# Patient Record
Sex: MALE | Race: BLACK OR AFRICAN AMERICAN | Employment: UNEMPLOYED | URBAN - METROPOLITAN AREA
[De-identification: names, ages, dates, MRNs, and addresses within clinical notes are randomized per-mention and may not be internally consistent; named-entity substitution may affect disease eponyms.]

---

## 2017-01-02 ENCOUNTER — HOSPITAL ENCOUNTER (EMERGENCY)
Age: 67
Discharge: HOME | End: 2017-01-02
Attending: EMERGENCY MEDICINE
Payer: OTHER GOVERNMENT

## 2017-01-02 ENCOUNTER — APPOINTMENT (OUTPATIENT)
Dept: GENERAL RADIOLOGY | Age: 67
End: 2017-01-02
Attending: EMERGENCY MEDICINE
Payer: OTHER GOVERNMENT

## 2017-01-02 ENCOUNTER — APPOINTMENT (OUTPATIENT)
Dept: CT IMAGING | Age: 67
End: 2017-01-02
Attending: EMERGENCY MEDICINE
Payer: OTHER GOVERNMENT

## 2017-01-02 VITALS
OXYGEN SATURATION: 99 % | RESPIRATION RATE: 16 BRPM | DIASTOLIC BLOOD PRESSURE: 74 MMHG | HEART RATE: 84 BPM | BODY MASS INDEX: 32.47 KG/M2 | HEIGHT: 66 IN | WEIGHT: 202 LBS | TEMPERATURE: 99 F | SYSTOLIC BLOOD PRESSURE: 142 MMHG

## 2017-01-02 DIAGNOSIS — R56.9 SEIZURE (HCC): Primary | ICD-10-CM

## 2017-01-02 LAB
ALBUMIN SERPL BCP-MCNC: 3.4 G/DL (ref 3.4–5)
ALBUMIN/GLOB SERPL: 0.8 {RATIO} (ref 0.8–1.7)
ALP SERPL-CCNC: 108 U/L (ref 45–117)
ALT SERPL-CCNC: 35 U/L (ref 16–61)
ANION GAP BLD CALC-SCNC: 17 MMOL/L (ref 3–18)
AST SERPL W P-5'-P-CCNC: 17 U/L (ref 15–37)
BASOPHILS # BLD AUTO: 0 K/UL (ref 0–0.06)
BASOPHILS # BLD: 0 % (ref 0–2)
BILIRUB SERPL-MCNC: 0.4 MG/DL (ref 0.2–1)
BUN SERPL-MCNC: 29 MG/DL (ref 7–18)
BUN/CREAT SERPL: 15 (ref 12–20)
CALCIUM SERPL-MCNC: 9.1 MG/DL (ref 8.5–10.1)
CHLORIDE SERPL-SCNC: 103 MMOL/L (ref 100–108)
CK SERPL-CCNC: 306 U/L (ref 39–308)
CO2 SERPL-SCNC: 20 MMOL/L (ref 21–32)
CREAT SERPL-MCNC: 1.91 MG/DL (ref 0.6–1.3)
DIFFERENTIAL METHOD BLD: ABNORMAL
EOSINOPHIL # BLD: 0.4 K/UL (ref 0–0.4)
EOSINOPHIL NFR BLD: 4 % (ref 0–5)
ERYTHROCYTE [DISTWIDTH] IN BLOOD BY AUTOMATED COUNT: 12.5 % (ref 11.6–14.5)
GLOBULIN SER CALC-MCNC: 4.2 G/DL (ref 2–4)
GLUCOSE SERPL-MCNC: 253 MG/DL (ref 74–99)
HCT VFR BLD AUTO: 37.8 % (ref 36–48)
HGB BLD-MCNC: 13 G/DL (ref 13–16)
LACTATE SERPL-SCNC: 2.1 MMOL/L (ref 0.4–2)
LACTATE SERPL-SCNC: 9.3 MMOL/L (ref 0.4–2)
LYMPHOCYTES # BLD AUTO: 17 % (ref 21–52)
LYMPHOCYTES # BLD: 1.6 K/UL (ref 0.9–3.6)
MAGNESIUM SERPL-MCNC: 2 MG/DL (ref 1.8–2.4)
MCH RBC QN AUTO: 34.3 PG (ref 24–34)
MCHC RBC AUTO-ENTMCNC: 34.4 G/DL (ref 31–37)
MCV RBC AUTO: 99.7 FL (ref 74–97)
MONOCYTES # BLD: 0.4 K/UL (ref 0.05–1.2)
MONOCYTES NFR BLD AUTO: 4 % (ref 3–10)
NEUTS SEG # BLD: 7.1 K/UL (ref 1.8–8)
NEUTS SEG NFR BLD AUTO: 75 % (ref 40–73)
PLATELET # BLD AUTO: 209 K/UL (ref 135–420)
PMV BLD AUTO: 10 FL (ref 9.2–11.8)
POTASSIUM SERPL-SCNC: 4.9 MMOL/L (ref 3.5–5.5)
PROT SERPL-MCNC: 7.6 G/DL (ref 6.4–8.2)
RBC # BLD AUTO: 3.79 M/UL (ref 4.7–5.5)
SODIUM SERPL-SCNC: 140 MMOL/L (ref 136–145)
WBC # BLD AUTO: 9.5 K/UL (ref 4.6–13.2)

## 2017-01-02 PROCEDURE — 70450 CT HEAD/BRAIN W/O DYE: CPT

## 2017-01-02 PROCEDURE — 80053 COMPREHEN METABOLIC PANEL: CPT | Performed by: EMERGENCY MEDICINE

## 2017-01-02 PROCEDURE — 96361 HYDRATE IV INFUSION ADD-ON: CPT

## 2017-01-02 PROCEDURE — 83735 ASSAY OF MAGNESIUM: CPT | Performed by: EMERGENCY MEDICINE

## 2017-01-02 PROCEDURE — 99285 EMERGENCY DEPT VISIT HI MDM: CPT

## 2017-01-02 PROCEDURE — 85025 COMPLETE CBC W/AUTO DIFF WBC: CPT | Performed by: EMERGENCY MEDICINE

## 2017-01-02 PROCEDURE — 83605 ASSAY OF LACTIC ACID: CPT | Performed by: EMERGENCY MEDICINE

## 2017-01-02 PROCEDURE — 96374 THER/PROPH/DIAG INJ IV PUSH: CPT

## 2017-01-02 PROCEDURE — 82550 ASSAY OF CK (CPK): CPT | Performed by: EMERGENCY MEDICINE

## 2017-01-02 PROCEDURE — 93005 ELECTROCARDIOGRAM TRACING: CPT

## 2017-01-02 PROCEDURE — 71010 XR CHEST PORT: CPT

## 2017-01-02 PROCEDURE — 74011250636 HC RX REV CODE- 250/636: Performed by: EMERGENCY MEDICINE

## 2017-01-02 RX ORDER — LEVETIRACETAM 10 MG/ML
1000 INJECTION INTRAVASCULAR ONCE
Status: COMPLETED | OUTPATIENT
Start: 2017-01-02 | End: 2017-01-02

## 2017-01-02 RX ORDER — SODIUM CHLORIDE 0.9 % (FLUSH) 0.9 %
5-10 SYRINGE (ML) INJECTION EVERY 8 HOURS
Status: DISCONTINUED | OUTPATIENT
Start: 2017-01-02 | End: 2017-01-02 | Stop reason: HOSPADM

## 2017-01-02 RX ORDER — SODIUM CHLORIDE 0.9 % (FLUSH) 0.9 %
5-10 SYRINGE (ML) INJECTION AS NEEDED
Status: DISCONTINUED | OUTPATIENT
Start: 2017-01-02 | End: 2017-01-02 | Stop reason: HOSPADM

## 2017-01-02 RX ADMIN — SODIUM CHLORIDE 1000 ML: 900 INJECTION, SOLUTION INTRAVENOUS at 13:12

## 2017-01-02 RX ADMIN — LEVETIRACETAM 1000 MG: 10 INJECTION INTRAVENOUS at 12:04

## 2017-01-02 NOTE — Clinical Note
SPECIAL DISCHARGE INSTRUCTIONS AND COMMENTS: 
 
General comments: Thank you for allowing us to provide you with excellent care today. We hope we addressed all of your concerns and needs. We strive to provide excellent quality care in the Emergency Depart ment. If you feel that you have not received excellent quality care or timely care, please ask to speak to the nurse manager. Please choose us in the future for your continued health care needs. Follow-up comments: The exam and treatment you rece ived in the Emergency Department were for an urgent problem that may be new for you and / or one which may be related to a worsening of a chronic or ongoing medical problem that you already had prior to this visit. In any case, today's treatment is not i ntended to be considered as complete care in all cases and thus, it is important that you follow-up with a doctor, nurse practitioner, or physicians assistant to: (1) confirm your diagnosis, (2) re-evaluation of changes in your illness and treatment, an d (3) for ongoing care. In some cases we may have contacted your doctor or a specific specialist who may be involved in your future evaluation and care but in any case, take this sheet with you when you go to your follow-up visit or refer to the infor ann on these sheets when you are calling to arrange an appointment - it may prove helpful in making the appointment. Prescribed Medications: Unless you have been directed by the provider to change your current medicines, you should continue to alaina e them as before. If you have been prescribed medicines, please take them as directed. In some cases, these new medicines are intended to replace a medicine that you are currently taking and if so, this will be noted below.  
 
 Our expectation is that y our condition will improve by following the doctor's recommendations, however, if in the event your condition worsens or does not improve as expected, please follow-up with your PCP or if unable to reach your usual health care provider, you should return  to the Emergency Department. We are available 24 hours a day.   
 
SPECIFIC INSTRUCTIONS PROVIDED BY YOUR DOCTOR, Katie Rivera MD:

## 2017-01-02 NOTE — DISCHARGE INSTRUCTIONS

## 2017-01-02 NOTE — ED PROVIDER NOTES
HPI Comments: 11:41 AM   Cristiane Singleton is a 77 y.o. Male with a h/o DM, HTN, and seizures presenting via EMS to the ED s/p seizure this AM. Pt reports that he doesn't remember anything that happened today prior to EMS arriving. States that he has not had a seizure in 1 year. Does not currently take any medication for seizures. Pt is unsure of what medications he has taken in the past for seizures. Former tobacco user. Pt denies fever, chills, N/V/D, neck pain, and any other Sx or complaints. History limited due to pt's condition (post ictal and slightly confused). The history is provided by the patient. No  was used. Past Medical History:   Diagnosis Date    Diabetes ([facility] [address]     Hypercholesteremia     Hypertension     Seizures ([facility] [address]        Past Surgical History:   Procedure Laterality Date    Pr abdomen surgery proc unlisted       appendectomy         No family history on file. Social History     Social History    Marital status:      Spouse name: N/A    Number of children: N/A    Years of education: N/A     Occupational History    Not on file. Social History Main Topics    Smoking status: Never Smoker    Smokeless tobacco: Not on file    Alcohol use Yes      Comment: ocassionally    Drug use: Not on file    Sexual activity: Not on file     Other Topics Concern    Not on file     Social History Narrative    No narrative on file         ALLERGIES: Pcn [penicillins]    Review of Systems   Unable to perform ROS: Acuity of condition   Constitutional: Negative for appetite change, chills, fever and unexpected weight change. HENT: Negative for congestion, sore throat and trouble swallowing. Eyes: Negative for pain, redness and visual disturbance. Respiratory: Negative for cough, shortness of breath and wheezing. Cardiovascular: Negative for chest pain and leg swelling.    Gastrointestinal: Negative for abdominal pain, blood in stool, constipation, diarrhea, nausea and vomiting. Endocrine: Negative for polyuria. Genitourinary: Negative for difficulty urinating, dysuria and urgency. Musculoskeletal: Negative for arthralgias, myalgias and neck stiffness. Skin: Negative for rash. Allergic/Immunologic: Negative for immunocompromised state. Neurological: Negative for dizziness, syncope, weakness and headaches. Hematological: Does not bruise/bleed easily. Psychiatric/Behavioral: Negative for decreased concentration. Vitals:    01/02/17 1400 01/02/17 1415 01/02/17 1430 01/02/17 1445   BP: 156/78 164/75 158/76 169/80   Pulse: 91 91 95 91   Resp: 17 17 22 15   Temp:       SpO2: 100% 100% 99% 100%   Weight:       Height:                Physical Exam   Nursing note and vitals reviewed. -------------------------PHYSICAL EXAM-------------------------    Vital signs and nursing notes reviewed  Nursing note and VS were reviewed      CONSTITUTIONAL: Well developed, well nourished and appears adequately hydrated. Awake and alert. Non-toxic in appearance, not diaphoretic. Afebrile. NAD. HEAD: Normocephalic, Atraumatic. EYES: Pupils are equal, round and reactive. Extra-ocular movements intact. Sclera anicteric. Conjunctiva not injected. ENT: Mucous membranes are moist. There is no erythema or swelling of the mucosal tissues or enlargement of the tonsillar tissue or the presence of exudates. No oral lesions or thrush. The left TM is unremarkable. Both EAC's are without swelling or erythema. Both TM's unremarkable. Nasal mucosa pink with no discharge and the turbinates are of normal size. NECK: Normal ROM. Neck is supple and nontender. No posterior cervical paraspinal or midline tenderness. No obvious enlargement of the thyroid. No significant anterior cervical adenopathy. Trachea is midline. CARDIOVASCULAR:  Distant but regular rhythm. No murmurs, rubs or gallops.  Distal pulses are 2+ and equal.    PULMONARY: Respiratory effort is normal and without the use of accessory muscles. Patient is speaking in full sentences. Clear to auscultation bilaterally. No wheezing, rales or rhonchi. CHEST WALL: Normal shape;  non tender to palpation; no crepitus    ABDOMINAL: Soft and non-distended. Obese, no obvious tenderness. NO peritoneal signs - No rebound, guarding or rigidity. Active bowel sounds present. BACK: No thoracolumbar midline or paraspinal tenderness. Full range of motion. No CVA tenderness. MUSCULOSKELETAL: No obvious soft tissue tenderness or sites of bony tenderness or deformities; Full range of motion in all extremities. No obvious muscle tenderness. No joint inflammation. No peripheral edema. No obvious skeletal injuries. SKIN: Skin is warm and dry. Good skin turgor. No diaphoresis, lesions,  Rashes, or petechiae. Cap Refill is Normal.    NEUROLOGICAL: Alert, awake and appropriately oriented. Normal speech. CN's are normal;  Motor - no focal weakness; no obvious sensory loss; Cerebellar function- intact; DTR's - 2+ equal    PSYCH: normal thought content; no expressed suicidal ideation. MDM  Number of Diagnoses or Management Options  Diagnosis management comments: INITIAL CLINICAL IMPRESSION and PLANS:  The patient presents with the primary complaint(s) of: seizure. The presentation, to include historical aspects and clinical findings are consistent with the DX of epilepsy. However, other possible DX's to consider as primary, associated with, or exacerbated by include:    1. Metabolic derangement    2.  Intracranial process       Amount and/or Complexity of Data Reviewed  Clinical lab tests: reviewed and ordered  Tests in the radiology section of CPT®: reviewed and ordered (CXR, CT Head)  Tests in the medicine section of CPT®: reviewed and ordered (EKG)  Independent visualization of images, tracings, or specimens: yes (EKG, CXR)      ED Course       Procedures    EKG interpretation: (Preliminary)  Sinus tachycardia at 112 bpm. RBBB. Left posterior fascicular block. CT 11/17/14: no change. EKG read by Trupti He MD at 11:51 AM.     X-RAY FINDINGS:  1:38 PM  Chest x-ray shows NAP. Pending review by Radiologist  Recorded by SHAHID Burrell, as dictated by Trupti He MD    PROGRESS NOTE:   1:41 PM  Pt has been re-examined by Trupti He MD. Once pt had become more alert, he now indicates that he continues to take his Keppra. However, its only 500 mg once a day. Written by SHAHID Burrell, as dictated by Trupti He MD.    ED CLINICAL SUMMARY - DISCHARGE     1:14 PM  CLINICAL COURSE while in the ED:      Intervention)s) while in ED:  ACTIONS / APPROACH: Based on the presenting RECURRENT history of seizure. My initial focus was to Determine the cause and extent of the problem and Initiate Treatment as Appropriate . Details of actions taken are noted below. SPECIFICS REGARDING APPROACH: based on the hx of not being on ACM's will do full load of Keppra. 1. DIAGNOSTIC RESULTS:   CT HEAD WO CONT   Final Result    1. No acute intracranial findings        2. Nonspecific white matter changes, likely related to chronic small vessel  Ischemia    As read by the radiologist.      XR CHEST PORT      Final Result    No radiographic evidence of an acute abnormality.     As read by the radiologist.            Labs Reviewed   CBC WITH AUTOMATED DIFF - Abnormal; Notable for the following:        Result Value    RBC 3.79 (*)     MCV 99.7 (*)     MCH 34.3 (*)     NEUTROPHILS 75 (*)     LYMPHOCYTES 17 (*)     All other components within normal limits   METABOLIC PANEL, COMPREHENSIVE - Abnormal; Notable for the following:     CO2 20 (*)     Glucose 253 (*)     BUN 29 (*)     Creatinine 1.91 (*)     GFR est AA 43 (*)     GFR est non-AA 35 (*)     Globulin 4.2 (*)     All other components within normal limits   LACTIC ACID, PLASMA - Abnormal; Notable for the following:     Lactic acid 9.3 (*)     All other components within normal limits   LACTIC ACID, PLASMA - Abnormal; Notable for the following:     Lactic acid 2.1 (*)     All other components within normal limits   MAGNESIUM   CK           Recent Results (from the past 12 hour(s))   CBC WITH AUTOMATED DIFF    Collection Time: 01/02/17 11:45 AM   Result Value Ref Range    WBC 9.5 4.6 - 13.2 K/uL    RBC 3.79 (L) 4.70 - 5.50 M/uL    HGB 13.0 13.0 - 16.0 g/dL    HCT 37.8 36.0 - 48.0 %    MCV 99.7 (H) 74.0 - 97.0 FL    MCH 34.3 (H) 24.0 - 34.0 PG    MCHC 34.4 31.0 - 37.0 g/dL    RDW 12.5 11.6 - 14.5 %    PLATELET 574 586 - 826 K/uL    MPV 10.0 9.2 - 11.8 FL    NEUTROPHILS 75 (H) 40 - 73 %    LYMPHOCYTES 17 (L) 21 - 52 %    MONOCYTES 4 3 - 10 %    EOSINOPHILS 4 0 - 5 %    BASOPHILS 0 0 - 2 %    ABS. NEUTROPHILS 7.1 1.8 - 8.0 K/UL    ABS. LYMPHOCYTES 1.6 0.9 - 3.6 K/UL    ABS. MONOCYTES 0.4 0.05 - 1.2 K/UL    ABS. EOSINOPHILS 0.4 0.0 - 0.4 K/UL    ABS. BASOPHILS 0.0 0.0 - 0.06 K/UL    DF AUTOMATED     METABOLIC PANEL, COMPREHENSIVE    Collection Time: 01/02/17 11:45 AM   Result Value Ref Range    Sodium 140 136 - 145 mmol/L    Potassium 4.9 3.5 - 5.5 mmol/L    Chloride 103 100 - 108 mmol/L    CO2 20 (L) 21 - 32 mmol/L    Anion gap 17 3.0 - 18 mmol/L    Glucose 253 (H) 74 - 99 mg/dL    BUN 29 (H) 7.0 - 18 MG/DL    Creatinine 1.91 (H) 0.6 - 1.3 MG/DL    BUN/Creatinine ratio 15 12 - 20      GFR est AA 43 (L) >60 ml/min/1.73m2    GFR est non-AA 35 (L) >60 ml/min/1.73m2    Calcium 9.1 8.5 - 10.1 MG/DL    Bilirubin, total 0.4 0.2 - 1.0 MG/DL    ALT 35 16 - 61 U/L    AST 17 15 - 37 U/L    Alk.  phosphatase 108 45 - 117 U/L    Protein, total 7.6 6.4 - 8.2 g/dL    Albumin 3.4 3.4 - 5.0 g/dL    Globulin 4.2 (H) 2.0 - 4.0 g/dL    A-G Ratio 0.8 0.8 - 1.7     MAGNESIUM    Collection Time: 01/02/17 11:45 AM   Result Value Ref Range    Magnesium 2.0 1.8 - 2.4 mg/dL   LACTIC ACID, PLASMA    Collection Time: 01/02/17 11:45 AM   Result Value Ref Range    Lactic acid 9.3 (HH) 0.4 - 2.0 MMOL/L   CK    Collection Time: 01/02/17 11:45 AM   Result Value Ref Range     39 - 308 U/L   EKG, 12 LEAD, INITIAL    Collection Time: 01/02/17 11:51 AM   Result Value Ref Range    Ventricular Rate 112 BPM    Atrial Rate 112 BPM    P-R Interval 126 ms    QRS Duration 136 ms    Q-T Interval 360 ms    QTC Calculation (Bezet) 491 ms    Calculated P Axis 62 degrees    Calculated R Axis 168 degrees    Calculated T Axis 47 degrees    Diagnosis       Sinus tachycardia  Right bundle branch block  Left posterior fascicular block  Bifascicular block  Abnormal ECG  When compared with ECG of 17-NOV-2014 01:29,  Sinus rhythm has replaced Atrial fibrillation  Questionable change in QRS axis     LACTIC ACID, PLASMA    Collection Time: 01/02/17  2:53 PM   Result Value Ref Range    Lactic acid 2.1 (HH) 0.4 - 2.0 MMOL/L       2. MEDICATIONS GIVEN:   Medications   sodium chloride (NS) flush 5-10 mL (not administered)   sodium chloride (NS) flush 5-10 mL (not administered)   levETIRAcetam (KEPPRA) 1,000 mg in 100 ml IVPB (0 mg IntraVENous IV Completed 1/2/17 1219)   sodium chloride 0.9 % bolus infusion 1,000 mL (0 mL IntraVENous IV Completed 1/2/17 2227)   sodium chloride 0.9 % bolus infusion 1,000 mL (0 mL IntraVENous IV Completed 1/2/17 1776)        Response to Intervention(s):   IMPROVED ; once pt became more clear, he notes that he has been taking Keppra but the dose is below the usual dosing parameters. Unanticipated Developments: NONE     ED COURSE - General Comment:  During the ED course I had re-evaluated the patient, answered their and /or their family's questions regarding my clinical impression, the patient's condition and plans for therapeutic interventions. The patient's ED course was uneventful and remained stable throughout.     CLINICAL IMPRESSION AND DISCUSSION:   I reviewed our electronic medical record system for any past medical records that were available that may contribute to the patients current condition, the nursing notes and vital signs from today's visit. Based on the clinical presentation, findings and results of diagnostic studies, as well as developments while in the ED,  I suspect the following: For the presentation noted above    The most likely cause or diagnosis is: seizure       DISCUSSION REGARDING DIAGNOSIS: The specifics regarding my clinical impression / diagnosis are as follows: At this time there is no clinical evidence to support other pertinent diagnostic considerations such as:   N/A    Finally, other diagnostic considerations during this visit are noted below in Clinical Impression. Specific Conversations:  NONE      DISPOSITION DECISION:     DISCHARGE: I feel that we have optimized outpatient assessment and management such that Philis Scheuermann is stable to be discharged and to continue with her care or complete any additional evaluation as appropriate at home or as an outpatient. Preparations will be made to discharge the patient. Present condition at the time of disposition: STABLE    DISCUSSION REGARDING THE DISPOSITION:         DISCHARGE NOTE:    Sohan Shepherd's  results have been reviewed with him. He has been counseled regarding his diagnosis, treatment, and plan. He verbally conveys understanding and agreement of the signs, symptoms, diagnosis, treatment and prognosis and additionally agrees to follow up as discussed. He also agrees with the care-plan and conveys that all of his questions have been answered. I have also provided discharge instructions for him that include: educational information regarding their diagnosis and treatment, and list of reasons why they would want to return to the ED prior to their follow-up appointment, should his condition change. The patient and/or family has been provided with education for proper Emergency Department utilization. CLINICAL IMPRESSION  1. Seizure ([address]        PLAN:  1. D/C home  2.    Patient's Medications   Start Taking    No medications on file   Continue Taking    APIXABAN (ELIQUIS) 2.5 MG TABLET    Take 1 tablet by mouth two (2) times a day. GLIPIZIDE (GLUCOTROL) 10 MG TABLET    Take 10 mg by mouth two (2) times a day. Dose unknown by pt     LEVETIRACETAM (KEPPRA) 1,000 MG TABLET    Take  by mouth two (2) times a day. Dose unknown by pt     LEVETIRACETAM (KEPPRA) 1,000 MG TABLET    Take 1 Tab by mouth two (2) times a day. METFORMIN (GLUCOPHAGE) 1,000 MG TABLET    Take 1,000 mg by mouth two (2) times daily (with meals). Dose unknown by pt     ROSUVASTATIN (CRESTOR) 20 MG TABLET    Take 20 mg by mouth daily. Dose unknown by pt     TERAZOSIN 10 MG TAB    Take  by mouth. Dose unknown by pt    These Medications have changed    No medications on file   Stop Taking    No medications on file     3. Follow-up Information     Follow up With Details Comments [address] [facility], DO Schedule an appointment as soon as possible for a visit in 2 days Follow up with your PCP. [address]      [facility] EMERGENCY DEPT  As needed, If symptoms worsen [address] [place] [address]  [phone]        Return if sxs worsen    ATTESTATIONS:  This note is prepared by Cain Mead, acting as Scribe for Katie Rivera MD.    Katie Rivera MD: The scribe's documentation has been prepared under my direction and personally reviewed by me in its entirety. I confirm that the note above accurately reflects all work, treatment, procedures, and medical decision making performed by me.
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
## 2017-01-02 NOTE — ED NOTES
Pt discharged to home, reviewed instructions with patient, patient verbalized understanding of instructions and follow up plan. Arm band removed and shredded. All questions answered. Patient discharged in no acute distress.

## 2017-01-03 LAB
ATRIAL RATE: 112 BPM
CALCULATED P AXIS, ECG09: 62 DEGREES
CALCULATED R AXIS, ECG10: 168 DEGREES
CALCULATED T AXIS, ECG11: 47 DEGREES
DIAGNOSIS, 93000: NORMAL
P-R INTERVAL, ECG05: 126 MS
Q-T INTERVAL, ECG07: 360 MS
QRS DURATION, ECG06: 136 MS
QTC CALCULATION (BEZET), ECG08: 491 MS
VENTRICULAR RATE, ECG03: 112 BPM

## 2017-04-21 ENCOUNTER — HOSPITAL ENCOUNTER (EMERGENCY)
Age: 67
Discharge: HOME | End: 2017-04-21
Attending: FAMILY MEDICINE
Payer: OTHER GOVERNMENT

## 2017-04-21 ENCOUNTER — APPOINTMENT (OUTPATIENT)
Dept: GENERAL RADIOLOGY | Age: 67
End: 2017-04-21
Attending: FAMILY MEDICINE
Payer: OTHER GOVERNMENT

## 2017-04-21 VITALS
SYSTOLIC BLOOD PRESSURE: 157 MMHG | HEIGHT: 66 IN | HEART RATE: 84 BPM | BODY MASS INDEX: 32.14 KG/M2 | DIASTOLIC BLOOD PRESSURE: 77 MMHG | OXYGEN SATURATION: 100 % | WEIGHT: 200 LBS | RESPIRATION RATE: 16 BRPM | TEMPERATURE: 97.9 F

## 2017-04-21 DIAGNOSIS — S70.02XA CONTUSION OF LEFT HIP, INITIAL ENCOUNTER: ICD-10-CM
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
## 2017-04-21 DIAGNOSIS — S46.812A TRAPEZIUS STRAIN, LEFT, INITIAL ENCOUNTER: Primary | ICD-10-CM

## 2017-04-21 PROCEDURE — 99283 EMERGENCY DEPT VISIT LOW MDM: CPT

## 2017-04-21 PROCEDURE — 74011250637 HC RX REV CODE- 250/637: Performed by: FAMILY MEDICINE

## 2017-04-21 PROCEDURE — 73030 X-RAY EXAM OF SHOULDER: CPT

## 2017-04-21 PROCEDURE — 73502 X-RAY EXAM HIP UNI 2-3 VIEWS: CPT

## 2017-04-21 RX ORDER — CYCLOBENZAPRINE HCL 10 MG
10 TABLET ORAL
Qty: 20 TAB | Refills: 0 | Status: SHIPPED | OUTPATIENT
Start: 2017-04-21

## 2017-04-21 RX ORDER — KETOROLAC TROMETHAMINE 10 MG/1
10 TABLET, FILM COATED ORAL ONCE
Status: COMPLETED | OUTPATIENT
Start: 2017-04-21 | End: 2017-04-21

## 2017-04-21 RX ORDER — HYDROCODONE BITARTRATE AND ACETAMINOPHEN 5; 325 MG/1; MG/1
1 TABLET ORAL
Qty: 10 TAB | Refills: 0 | Status: SHIPPED | OUTPATIENT
Start: 2017-04-21

## 2017-04-21 RX ADMIN — KETOROLAC TROMETHAMINE 10 MG: 10 TABLET, FILM COATED ORAL at 11:26
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
## 2017-04-21 NOTE — ED NOTES
Care assumed for discharge. Pt given scripts x2 with discharge instructions and verbalizes understanding. Patient armband removed and shredded. Pt discharged home ambulatory, no distress noted.
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
## 2020-08-08 ENCOUNTER — HOSPITAL ENCOUNTER (EMERGENCY)
Age: 70
Discharge: HOME | End: 2020-08-08
Attending: EMERGENCY MEDICINE
Payer: OTHER GOVERNMENT

## 2020-08-08 VITALS
TEMPERATURE: 97.5 F | OXYGEN SATURATION: 100 % | HEART RATE: 96 BPM | RESPIRATION RATE: 18 BRPM | BODY MASS INDEX: 32.28 KG/M2 | HEIGHT: 66 IN | SYSTOLIC BLOOD PRESSURE: 140 MMHG | DIASTOLIC BLOOD PRESSURE: 51 MMHG

## 2020-08-08 DIAGNOSIS — Z20.822 ENCOUNTER FOR LABORATORY TESTING FOR COVID-19 VIRUS: Primary | ICD-10-CM

## 2020-08-08 PROCEDURE — 99282 EMERGENCY DEPT VISIT SF MDM: CPT

## 2020-08-08 PROCEDURE — 87635 SARS-COV-2 COVID-19 AMP PRB: CPT

## 2020-08-08 NOTE — ED TRIAGE NOTES
Pt states \" I brought a patient to the ER and he was admitted and someone said that he was positive for Covid and so here my wife and I are to be tested just incase. \"

## 2020-08-08 NOTE — DISCHARGE INSTRUCTIONS
Patient Education        Coronavirus (GABPR-22): Care Instructions  Overview  The coronavirus disease (COVID-19) is caused by a virus. Symptoms may include a fever, a cough, and shortness of breath. It mainly spreads person-to-person through droplets from coughing and sneezing. The virus also can spread when people are in close contact with someone who is infected. Most people have mild symptoms and can take care of themselves at home. If their symptoms get worse, they may need care in a hospital. There is no medicine to fight the virus. It's important to not spread the virus to others. If you have COVID-19, wear a face cover anytime you are around other people. You need to isolate yourself while you are sick. Your doctor or local public health official will tell you when you no longer need to be isolated. Leave your home only if you need to get medical care. Follow-up care is a key part of your treatment and safety. Be sure to make and go to all appointments, and call your doctor if you are having problems. It's also a good idea to know your test results and keep a list of the medicines you take. How can you care for yourself at home? · Get extra rest. It can help you feel better. · Drink plenty of fluids. This helps replace fluids lost from fever. Fluids also help ease a scratchy throat. Water, soup, fruit juice, and hot tea with lemon are good choices. · Take acetaminophen (such as Tylenol) to reduce a fever. It may also help with muscle aches. Read and follow all instructions on the label. · Sponge your body with lukewarm water to help with fever. Don't use cold water or ice. · Use petroleum jelly on sore skin. This can help if the skin around your nose and lips becomes sore from rubbing a lot with tissues. Tips for isolation  · Wear a cloth face cover when you are around other people. It can help stop the spread of the virus when you cough or sneeze. · Limit contact with people in your home.  If possible, stay in a separate bedroom and use a separate bathroom. · If you have to leave home, avoid crowds and try to stay at least 6 feet away from other people. · Avoid contact with pets and other animals. · Cover your mouth and nose with a tissue when you cough or sneeze. Then throw it in the trash right away. · Wash your hands often, especially after you cough or sneeze. Use soap and water, and scrub for at least 20 seconds. If soap and water aren't available, use an alcohol-based hand . · Don't share personal household items. These include bedding, towels, cups and glasses, and eating utensils. · [address] in the warmest water allowed for the fabric type, and dry it completely. It's okay to wash other people's laundry with yours. · Clean and disinfect your home every day. Use household  and disinfectant wipes or sprays. Take special care to clean things that you grab with your hands. These include doorknobs, remote controls, phones, and handles on your refrigerator and microwave. And don't forget countertops, tabletops, bathrooms, and computer keyboards. When should you call for help? [address]GG[address] anytime you think you may need emergency care. For example, call if you have life-threatening symptoms, such as:  · You have severe trouble breathing. (You can't talk at all.)  · You have constant chest pain or pressure. · You are severely dizzy or lightheaded. · You are confused or can't think clearly. · Your face and lips have a blue color. · You pass out (lose consciousness) or are very hard to wake up. Call your doctor now or seek immediate medical care if:  · You have moderate trouble breathing. (You can't speak a full sentence.)  · You are coughing up blood (more than about 1 teaspoon). · You have signs of low blood pressure. These include feeling lightheaded; being too weak to stand; and having cold, pale, clammy skin.   Watch closely for changes in your health, and be sure to contact your doctor if:  · Your symptoms get worse. · You are not getting better as expected. Call before you go to the doctor's office. Follow their instructions. And wear a cloth face cover. Current as of: May 8, 2020               Content Version: 12.5  © 2006-2020 [employer]. Care instructions adapted under license by [employer] (which disclaims liability or warranty for this information). If you have questions about a medical condition or this instruction, always ask your healthcare professional. [place] [address] any warranty or liability for your use of this information.
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
## 2020-08-10 ENCOUNTER — TELEPHONE (OUTPATIENT)
Dept: CASE MANAGEMENT | Age: 70
End: 2020-08-10

## 2020-08-10 NOTE — TELEPHONE ENCOUNTER
Patient contacted regarding recent discharge and COVID-19 risk   Care Coordinator contacted the patient by telephone to perform post discharge assessment. Verified name and  with patient as identifiers. Patient has following risk factors of: diabetes. Care Coordinator reviewed discharge instructions, medical action plan and red flags related to discharge diagnosis. Reviewed and educated them on any new and changed medications related to discharge diagnosis. Advised obtaining a 90-day supply of all daily and as-needed medications. Education provided regarding infection prevention, and signs and symptoms of COVID-19 and when to seek medical attention with patient who verbalized understanding. Discussed exposure protocols and quarantine from [address] you at higher risk for severe illness  and given an opportunity for questions and concerns. The patient agrees to contact the COVID-19 hotline [phone] or PCP office for questions related to their healthcare. Care Coordinator provided contact information for future reference. From CDC: Are you at higher risk for severe illness?  Wash your hands often.  Avoid close contact (6 feet, which is about two arm lengths) with people who are sick.  Put distance between yourself and other people if COVID-19 is spreading in your community.  Clean and disinfect frequently touched surfaces.  Avoid all cruise travel and non-essential air travel.  Call your healthcare professional if you have concerns about COVID-19 and your underlying condition or if you are sick. For more information on steps you can take to protect yourself, see CDC's How to Protect Yourself      Patient/family/caregiver given information for [employer] and agrees to enroll no  Patient's preferred e-mail:    Patient's preferred phone number:   Based on Loop alert triggers, patient will be contacted by nurse care manager for worsening symptoms.     Plan for follow-up call in 7-14 days based on severity of symptoms and risk factors.

## 2020-08-11 LAB
SARS-COV-2, COV2NT: NOT DETECTED
SOURCE, COVRS: NORMAL
SPECIMEN TYPE, XMCV1T: NORMAL

## 2020-08-27 ENCOUNTER — TELEPHONE (OUTPATIENT)
Dept: CASE MANAGEMENT | Age: 70
End: 2020-08-27

## 2021-01-15 ENCOUNTER — HOSPITAL ENCOUNTER (EMERGENCY)
Age: 71
Discharge: HOME | End: 2021-01-15
Attending: EMERGENCY MEDICINE | Admitting: EMERGENCY MEDICINE

## 2021-01-15 VITALS
DIASTOLIC BLOOD PRESSURE: 67 MMHG | TEMPERATURE: 97.3 F | SYSTOLIC BLOOD PRESSURE: 129 MMHG | WEIGHT: 210 LBS | RESPIRATION RATE: 18 BRPM | HEIGHT: 67 IN | HEART RATE: 83 BPM | BODY MASS INDEX: 32.96 KG/M2 | OXYGEN SATURATION: 99 %

## 2021-01-15 DIAGNOSIS — Z20.822 ENCOUNTER FOR LABORATORY TESTING FOR COVID-19 VIRUS: Primary | ICD-10-CM

## 2021-01-15 PROCEDURE — 99281 EMR DPT VST MAYX REQ PHY/QHP: CPT

## 2021-01-15 PROCEDURE — 87635 SARS-COV-2 COVID-19 AMP PRB: CPT

## 2021-01-15 NOTE — ED PROVIDER NOTES
EMERGENCY DEPARTMENT HISTORY AND PHYSICAL EXAM    Date: 1/15/2021  Patient Name: Jaylen Song    History of Presenting Illness     Chief Complaint   Patient presents with    Covid Testing         History Provided By: Patient    11:00 AM  Jaylen Song is a 79 y.o. male with PMHX of A. fib, hypertension, hyperlipidemia, seizures, diabetes who presents to the emergency department C/O request for COVID-19 test.  Patient asymptomatic. States he may have been exposed to somebody at the [employer] 1/9/2021 (6 days ago). Pt denies fever, cough, shortness of breath, and any other sxs or complaints. PCP: Sophy Rizzo MD    Current Outpatient Medications   Medication Sig Dispense Refill    LISINOPRIL PO Take  by mouth.  cyclobenzaprine (FLEXERIL) 10 mg tablet Take 1 Tab by mouth three (3) times daily (with meals). 20 Tab 0    HYDROcodone-acetaminophen (NORCO) 5-325 mg per tablet Take 1 Tab by mouth every four (4) hours as needed for Pain. Max Daily Amount: 6 Tabs. 10 Tab 0    apixaban (ELIQUIS) 2.5 mg tablet Take 1 tablet by mouth two (2) times a day. 60 tablet 0    levETIRAcetam (KEPPRA) 1,000 mg tablet Take 1 Tab by mouth two (2) times a day. 60 Tab 0    levetiracetam (KEPPRA) 1,000 mg tablet Take  by mouth two (2) times a day. Dose unknown by pt       glipiZIDE (GLUCOTROL) 10 mg tablet Take 10 mg by mouth two (2) times a day. Dose unknown by pt       rosuvastatin (CRESTOR) 20 mg tablet Take 20 mg by mouth daily. Dose unknown by pt          Past History     Past Medical History:  Past Medical History:   Diagnosis Date    Atrial fibrillation ([address]     Diabetes ([address]     Hypercholesteremia     Hypertension     Seizures ([address]        Past Surgical History:  Past Surgical History:   Procedure Laterality Date    [place] ABDOMEN SURGERY PROC UNLISTED      appendectomy       Family History:  History reviewed. No pertinent family history.     Social History:  Social History     Tobacco Use    Smoking status: Never Smoker  Smokeless tobacco: Never Used   Substance Use Topics    Alcohol use: Yes     Comment: ocassionally    Drug use: Never       Allergies: Allergies   Allergen Reactions    Pcn [Penicillins] Other (comments)         Review of Systems   Review of Systems   Constitutional: Negative for fever. Respiratory: Negative for cough and shortness of breath. All other systems reviewed and are negative. Physical Exam     Vitals:    01/15/21 1051   BP: 129/67   Pulse: 83   Resp: 18   Temp: 97.3 °F (36.3 °C)   SpO2: 99%   Weight: 95.3 kg (210 lb)   Height: 5' 7\" (1.702 m)     Physical Exam  Vital signs and nursing notes reviewed. CONSTITUTIONAL: Alert. Well-appearing; well-nourished; in no apparent distress. HEAD: Normocephalic; atraumatic. CV: Normal S1, S2; no murmurs, rubs, or gallops. RESPIRATORY: Normal chest excursion with respiration; breath sounds clear and equal bilaterally; no wheezes, rhonchi, or rales. NEURO: A & O x3. PSYCH:  Mood and affect appropriate. Diagnostic Study Results     Labs -     Recent Results (from the past 12 hour(s))   SARS-COV-2    Collection Time: 01/15/21 10:53 AM   Result Value Ref Range    SARS-CoV-2 PENDING     Specimen source Nasopharyngeal      Specimen type NP Swab         Radiologic Studies -   No orders to display     CT Results  (Last 48 hours)    None        CXR Results  (Last 48 hours)    None          Medications given in the ED-  Medications - No data to display      Medical Decision Making   I am the first provider for this patient. I reviewed the vital signs, available nursing notes, past medical history, past surgical history, family history and social history. Vital Signs-Reviewed the patient's vital signs. Records Reviewed: Nursing Notes      Procedures:  Procedures    ED Course:  11:00 AM   Initial assessment performed.  The patients presenting problems have been discussed, and they are in agreement with the care plan formulated and outlined with them. I have encouraged them to ask questions as they arise throughout their visit. Provider Notes (Medical Decision Making): James Acevedo is a 79 y.o. male presents for COVID-19 testing status post possible exposure 6 days ago. Advised that even if the test is negative today, they should remain in quarantine for 14 days post exposure and come to the ED if any significant Covid symptoms like shortness of breath or weakness. Diagnosis and Disposition       DISCHARGE NOTE:    Mercedes Shepherd's  results have been reviewed with him. He has been counseled regarding his diagnosis, treatment, and plan. He verbally conveys understanding and agreement of the signs, symptoms, diagnosis, treatment and prognosis and additionally agrees to follow up as discussed. He also agrees with the care-plan and conveys that all of his questions have been answered. I have also provided discharge instructions for him that include: educational information regarding their diagnosis and treatment, and list of reasons why they would want to return to the ED prior to their follow-up appointment, should his condition change. He has been provided with education for proper emergency department utilization. CLINICAL IMPRESSION:    1. Encounter for laboratory testing for COVID-19 virus        PLAN:  1. D/C Home  2. Discharge Medication List as of 1/15/2021 11:08 AM        3. Follow-up Information     Follow up With Specialties Details Why Contact Info    Your PCP   As needed     [facility] EMERGENCY DEPT Emergency Medicine  As needed, If symptoms worsen [address] [place] [address]  [phone]        _______________________________      Please note that this dictation was completed with [employer], the [employer] voice recognition software. Quite often unanticipated grammatical, syntax, homophones, and other interpretive errors are inadvertently transcribed by the computer software.   Please disregard these errors. Please excuse any errors that have escaped final proofreading.

## 2021-01-16 LAB
SARS-COV-2, COV2NT: NOT DETECTED
SOURCE, COVRS: NORMAL
SPECIMEN TYPE, XMCV1T: NORMAL

## 2021-02-16 ENCOUNTER — HOSPITAL ENCOUNTER (INPATIENT)
Age: 71
LOS: 1 days | Discharge: HOME | DRG: 179 | End: 2021-02-17
Attending: EMERGENCY MEDICINE | Admitting: FAMILY MEDICINE
Payer: MEDICARE

## 2021-02-16 DIAGNOSIS — R19.7 DIARRHEA, UNSPECIFIED TYPE: ICD-10-CM

## 2021-02-16 DIAGNOSIS — N17.9 AKI (ACUTE KIDNEY INJURY) (HCC): Primary | ICD-10-CM

## 2021-02-16 PROBLEM — I10 HTN (HYPERTENSION): Status: ACTIVE | Noted: 2021-02-16

## 2021-02-16 PROBLEM — I48.0 PAF (PAROXYSMAL ATRIAL FIBRILLATION) (HCC): Status: ACTIVE | Noted: 2021-02-16

## 2021-02-16 PROBLEM — E86.0 DEHYDRATION: Status: ACTIVE | Noted: 2021-02-16

## 2021-02-16 PROBLEM — E11.9 DM (DIABETES MELLITUS) (HCC): Status: ACTIVE | Noted: 2021-02-16

## 2021-02-16 PROBLEM — Z20.822 SUSPECTED 2019-NCOV INFECTION: Status: ACTIVE | Noted: 2021-02-16

## 2021-02-16 LAB
ALBUMIN SERPL-MCNC: 3 G/DL (ref 3.4–5)
ALBUMIN SERPL-MCNC: 3.3 G/DL (ref 3.4–5)
ALBUMIN/GLOB SERPL: 0.9 {RATIO} (ref 0.8–1.7)
ALP SERPL-CCNC: 110 U/L (ref 45–117)
ALT SERPL-CCNC: 49 U/L (ref 16–61)
ANION GAP SERPL CALC-SCNC: 4 MMOL/L (ref 3–18)
ANION GAP SERPL CALC-SCNC: 8 MMOL/L (ref 3–18)
ANION GAP SERPL CALC-SCNC: 8 MMOL/L (ref 3–18)
AST SERPL-CCNC: 48 U/L (ref 10–38)
ATRIAL RATE: 83 BPM
BASOPHILS # BLD: 0 K/UL (ref 0–0.1)
BASOPHILS NFR BLD: 0 % (ref 0–2)
BILIRUB SERPL-MCNC: 0.6 MG/DL (ref 0.2–1)
BUN SERPL-MCNC: 26 MG/DL (ref 7–18)
BUN SERPL-MCNC: 28 MG/DL (ref 7–18)
BUN SERPL-MCNC: 30 MG/DL (ref 7–18)
BUN/CREAT SERPL: 12 (ref 12–20)
BUN/CREAT SERPL: 12 (ref 12–20)
BUN/CREAT SERPL: 13 (ref 12–20)
CALCIUM SERPL-MCNC: 7.6 MG/DL (ref 8.5–10.1)
CALCIUM SERPL-MCNC: 8.1 MG/DL (ref 8.5–10.1)
CALCIUM SERPL-MCNC: 8.7 MG/DL (ref 8.5–10.1)
CALCULATED P AXIS, ECG09: 64 DEGREES
CALCULATED R AXIS, ECG10: 134 DEGREES
CALCULATED T AXIS, ECG11: 61 DEGREES
CHLORIDE SERPL-SCNC: 107 MMOL/L (ref 100–111)
CHLORIDE SERPL-SCNC: 109 MMOL/L (ref 100–111)
CHLORIDE SERPL-SCNC: 111 MMOL/L (ref 100–111)
CK MB CFR SERPL CALC: NORMAL % (ref 0–4)
CK MB SERPL-MCNC: <1 NG/ML (ref 5–25)
CK SERPL-CCNC: 183 U/L (ref 39–308)
CO2 SERPL-SCNC: 24 MMOL/L (ref 21–32)
CO2 SERPL-SCNC: 24 MMOL/L (ref 21–32)
CO2 SERPL-SCNC: 25 MMOL/L (ref 21–32)
CREAT SERPL-MCNC: 2.06 MG/DL (ref 0.6–1.3)
CREAT SERPL-MCNC: 2.25 MG/DL (ref 0.6–1.3)
CREAT SERPL-MCNC: 2.47 MG/DL (ref 0.6–1.3)
DIAGNOSIS, 93000: NORMAL
DIFFERENTIAL METHOD BLD: ABNORMAL
EOSINOPHIL # BLD: 0 K/UL (ref 0–0.4)
EOSINOPHIL NFR BLD: 1 % (ref 0–5)
ERYTHROCYTE [DISTWIDTH] IN BLOOD BY AUTOMATED COUNT: 12 % (ref 11.6–14.5)
GLOBULIN SER CALC-MCNC: 3.7 G/DL (ref 2–4)
GLUCOSE BLD STRIP.AUTO-MCNC: 114 MG/DL (ref 70–110)
GLUCOSE BLD STRIP.AUTO-MCNC: 58 MG/DL (ref 70–110)
GLUCOSE BLD STRIP.AUTO-MCNC: 64 MG/DL (ref 70–110)
GLUCOSE SERPL-MCNC: 142 MG/DL (ref 74–99)
GLUCOSE SERPL-MCNC: 48 MG/DL (ref 74–99)
GLUCOSE SERPL-MCNC: 90 MG/DL (ref 74–99)
HCT VFR BLD AUTO: 37.9 % (ref 36–48)
HGB BLD-MCNC: 12.8 G/DL (ref 13–16)
LYMPHOCYTES # BLD: 0.8 K/UL (ref 0.9–3.6)
LYMPHOCYTES NFR BLD: 21 % (ref 21–52)
MCH RBC QN AUTO: 34.9 PG (ref 24–34)
MCHC RBC AUTO-ENTMCNC: 33.8 G/DL (ref 31–37)
MCV RBC AUTO: 103.3 FL (ref 74–97)
MONOCYTES # BLD: 0.5 K/UL (ref 0.05–1.2)
MONOCYTES NFR BLD: 14 % (ref 3–10)
NEUTS SEG # BLD: 2.4 K/UL (ref 1.8–8)
NEUTS SEG NFR BLD: 64 % (ref 40–73)
P-R INTERVAL, ECG05: 128 MS
PHOSPHATE SERPL-MCNC: 3.6 MG/DL (ref 2.5–4.9)
PLATELET # BLD AUTO: 158 K/UL (ref 135–420)
PMV BLD AUTO: 10.6 FL (ref 9.2–11.8)
POTASSIUM SERPL-SCNC: 4.3 MMOL/L (ref 3.5–5.5)
POTASSIUM SERPL-SCNC: 4.3 MMOL/L (ref 3.5–5.5)
POTASSIUM SERPL-SCNC: 4.5 MMOL/L (ref 3.5–5.5)
PROT SERPL-MCNC: 7 G/DL (ref 6.4–8.2)
Q-T INTERVAL, ECG07: 392 MS
QRS DURATION, ECG06: 134 MS
QTC CALCULATION (BEZET), ECG08: 460 MS
RBC # BLD AUTO: 3.67 M/UL (ref 4.7–5.5)
SARS-COV-2, COV2: NORMAL
SODIUM SERPL-SCNC: 139 MMOL/L (ref 136–145)
SODIUM SERPL-SCNC: 140 MMOL/L (ref 136–145)
SODIUM SERPL-SCNC: 141 MMOL/L (ref 136–145)
TROPONIN I SERPL-MCNC: <0.02 NG/ML (ref 0–0.04)
VENTRICULAR RATE, ECG03: 83 BPM
WBC # BLD AUTO: 3.8 K/UL (ref 4.6–13.2)

## 2021-02-16 PROCEDURE — 82962 GLUCOSE BLOOD TEST: CPT

## 2021-02-16 PROCEDURE — 99285 EMERGENCY DEPT VISIT HI MDM: CPT

## 2021-02-16 PROCEDURE — 65660000000 HC RM CCU STEPDOWN

## 2021-02-16 PROCEDURE — 74011250636 HC RX REV CODE- 250/636: Performed by: FAMILY MEDICINE

## 2021-02-16 PROCEDURE — U0003 INFECTIOUS AGENT DETECTION BY NUCLEIC ACID (DNA OR RNA); SEVERE ACUTE RESPIRATORY SYNDROME CORONAVIRUS 2 (SARS-COV-2) (CORONAVIRUS DISEASE [COVID-19]), AMPLIFIED PROBE TECHNIQUE, MAKING USE OF HIGH THROUGHPUT TECHNOLOGIES AS DESCRIBED BY CMS-2020-01-R: HCPCS

## 2021-02-16 PROCEDURE — 80053 COMPREHEN METABOLIC PANEL: CPT

## 2021-02-16 PROCEDURE — 93005 ELECTROCARDIOGRAM TRACING: CPT

## 2021-02-16 PROCEDURE — 85025 COMPLETE CBC W/AUTO DIFF WBC: CPT

## 2021-02-16 PROCEDURE — 96361 HYDRATE IV INFUSION ADD-ON: CPT

## 2021-02-16 PROCEDURE — 96360 HYDRATION IV INFUSION INIT: CPT

## 2021-02-16 PROCEDURE — 82553 CREATINE MB FRACTION: CPT

## 2021-02-16 PROCEDURE — 74011250636 HC RX REV CODE- 250/636: Performed by: PHYSICIAN ASSISTANT

## 2021-02-16 PROCEDURE — 84156 ASSAY OF PROTEIN URINE: CPT

## 2021-02-16 PROCEDURE — 80069 RENAL FUNCTION PANEL: CPT

## 2021-02-16 PROCEDURE — 94761 N-INVAS EAR/PLS OXIMETRY MLT: CPT

## 2021-02-16 PROCEDURE — 36415 COLL VENOUS BLD VENIPUNCTURE: CPT

## 2021-02-16 RX ORDER — MAGNESIUM SULFATE 100 %
4 CRYSTALS MISCELLANEOUS AS NEEDED
Status: DISCONTINUED | OUTPATIENT
Start: 2021-02-16 | End: 2021-02-17 | Stop reason: HOSPADM

## 2021-02-16 RX ORDER — SODIUM CHLORIDE 0.9 % (FLUSH) 0.9 %
5-40 SYRINGE (ML) INJECTION EVERY 8 HOURS
Status: DISCONTINUED | OUTPATIENT
Start: 2021-02-16 | End: 2021-02-17 | Stop reason: HOSPADM

## 2021-02-16 RX ORDER — SODIUM CHLORIDE 9 MG/ML
125 INJECTION, SOLUTION INTRAVENOUS CONTINUOUS
Status: DISCONTINUED | OUTPATIENT
Start: 2021-02-16 | End: 2021-02-17 | Stop reason: HOSPADM

## 2021-02-16 RX ORDER — INSULIN LISPRO 100 [IU]/ML
INJECTION, SOLUTION INTRAVENOUS; SUBCUTANEOUS
Status: DISCONTINUED | OUTPATIENT
Start: 2021-02-16 | End: 2021-02-17 | Stop reason: HOSPADM

## 2021-02-16 RX ORDER — SODIUM CHLORIDE 0.9 % (FLUSH) 0.9 %
5-40 SYRINGE (ML) INJECTION AS NEEDED
Status: DISCONTINUED | OUTPATIENT
Start: 2021-02-16 | End: 2021-02-17 | Stop reason: HOSPADM

## 2021-02-16 RX ORDER — DEXTROSE MONOHYDRATE 100 MG/ML
125-250 INJECTION, SOLUTION INTRAVENOUS AS NEEDED
Status: DISCONTINUED | OUTPATIENT
Start: 2021-02-16 | End: 2021-02-17 | Stop reason: HOSPADM

## 2021-02-16 RX ADMIN — SODIUM CHLORIDE 500 ML: 900 INJECTION, SOLUTION INTRAVENOUS at 13:14

## 2021-02-16 RX ADMIN — SODIUM CHLORIDE 125 ML/HR: 900 INJECTION, SOLUTION INTRAVENOUS at 18:18

## 2021-02-16 RX ADMIN — SODIUM CHLORIDE 1000 ML: 900 INJECTION, SOLUTION INTRAVENOUS at 14:11

## 2021-02-16 RX ADMIN — Medication 10 ML: at 22:00

## 2021-02-16 NOTE — ED NOTES
TRANSFER - OUT REPORT:    Verbal report given to June Houston RN(name) on [facility]       Report consisted of patients Situation, Background, Assessment and   Recommendations(SBAR). Information from the following report(s) SBAR, ED Summary, [facility] and Recent Results was reviewed with the receiving nurse. Lines:   Peripheral IV 02/16/21 Right Forearm (Active)   Site Assessment Clean, dry, & intact 02/16/21 1227   Phlebitis Assessment 0 02/16/21 1227   Infiltration Assessment 0 02/16/21 1227   Dressing Status Clean, dry, & intact 02/16/21 1227   Hub Color/Line Status Pink 02/16/21 1227   Action Taken Blood drawn 02/16/21 1227   Alcohol Cap Used Yes 02/16/21 1227        Opportunity for questions and clarification was provided.       Patient transported with:   [employer] Ro[employer]

## 2021-02-16 NOTE — H&P
History & Physical    Patient: Gracy Julien MRN: [id number]  CSN: [id number]    YOB: 1950  Age: 79 y.o. Sex: male      DOA: 2/16/2021  Primary Care Provider:  Other, MD Sophy      Assessment/Plan   Acute Kidney Injury -  Likely due to significant fluid loss from persistent diarrhea  Creatinine 2.47, baseline from [facility]  is 1.7  Rehydrate and trend BMP   Order bilateral renal ultrasound   Order UA   Cardiac enzymes pending, specifically to review CK to evaluate for rhabdomyolysis    Diarrhea - associated with malaise, chills, anorexia and body sweats  Recent covid vaccine, #1  No stools today   Supportive care     Suspect COVID-19 infection -  COVID test pending   Recent vaccine  No acute 02 requirement   covid protocol     Hypertension -  Controlled   Will monitor BP   Resume BP meds as appropriate     Diabtetes mellitus -  fsbg qac and qhs, SSI, ADA     HLD -  Resume home meds as appropriate     PAF on eliquis -  No acute issues   Resume eliquis     Seizure d/o -  No acute isses  Resume home meds       Patient Active Problem List   Diagnosis Code    Seizure ([facility] R56.9    BERNADETTE (acute kidney injury) ([facility] N17.9    Diarrhea R19.7    Suspected 2019-nCoV infection Z20.822    PAF (paroxysmal atrial fibrillation) (HCC) I48.0    DM (diabetes mellitus) ([facility] E11.9    HTN (hypertension) I10    Dehydration E86.0     Estimated length of stay : 2-3 days    CC: malaise, chills, anorexia and body sweats       HPI:     Gracy Julien is a 79 y.o. male with PMHX of hypertension, hyperlipidemia, diabetes, seizures, atrial fibrillation on Eliquis who presents to the emergency department C/O body aches and diarrhea. Patient states he developed intermittent diarrhea last week, has been taking antidiarrhea medication with relief. He had his first COVID-19 vaccine at the [place] [address] 5 days ago. He had bilateral knee injections that same day coincidentally.   The following day he developed mild body aches, mostly in his legs and shoulders. 2 days ago, he had increased body aches, sweats, mild headache and his knee gave out on him and caused him to almost fall. Pt denies fever, chest pain, shortness of breath, abdominal pain, vomiting, and any other sxs or complaints. Past Medical History:   Diagnosis Date    At[facility] ([facility]     Diabetes ([facility]     Hypercholesteremia     Hypertension     Seizures ([facility]        Past Surgical History:   Procedure Laterality Date    [place] ABDOMEN SURGERY PROC UNLISTED      appendectomy       History reviewed. No pertinent family history. Social History     Socioeconomic History    Marital status:      Spouse name: Not on file    Number of children: Not on file    Years of education: Not on file    Highest education level: Not on file   Tobacco Use    Smoking status: Never Smoker    Smokeless tobacco: Never Used   Substance and Sexual Activity    Alcohol use: Yes     Comment: ocassionally    Drug use: Never       Prior to Admission medications    Medication Sig Start Date End Date Taking? Authorizing Provider   LISINOPRIL PO Take  by mouth. Sophy Rizzo MD   cyclobenzaprine (FLEXERIL) 10 mg tablet Take 1 Tab by mouth three (3) times daily (with meals). 4/21/17   Jean Bernard MD   HYDROcodone-acetaminophen (NORCO) 5-325 mg per tablet Take 1 Tab by mouth every four (4) hours as needed for Pain. Max Daily Amount: 6 Tabs. 4/21/17   Jean Bernard MD   apixaban (ELIQUIS) 2.5 mg tablet Take 1 tablet by mouth two (2) times a day. 11/19/14   Jovi Hernandez DO   levETIRAcetam (KEPPRA) 1,000 mg tablet Take 1 Tab by mouth two (2) times a day. 12/24/12   Angella Noble MD   levetiracetam (KEPPRA) 1,000 mg tablet Take  by mouth two (2) times a day. Dose unknown by pt     Sophy Rizzo MD   glipiZIDE (GLUCOTROL) 10 mg tablet Take 10 mg by mouth two (2) times a day.  Dose unknown by pt     Sophy Rizzo MD   rosuvastatin (CRESTOR) 20 mg tablet Take 20 mg by mouth daily. Dose unknown by pt     Other, MD Sophy       Allergies   Allergen Reactions    Pcn [Penicillins] Other (comments)       Review of Systems  Gen: No fever, chills, malaise, weight loss/gain. Heent: No headache, rhinorrhea, epistaxis, ear pain, hearing loss, sinus pain, neck pain/stiffness, sore throat. Heart: No chest pain, palpitations, MALDONADO, pnd, or orthopnea. Resp: No cough, hemoptysis, wheezing and shortness of breath. GI: No nausea, vomiting, diarrhea, constipation, melena or hematochezia. : No urinary obstruction, dysuria or hematuria. Derm: No rash, new skin lesion or pruritis. Musc/skeletal: no bone or joint complains. Vasc: No edema, cyanosis or claudication. Endo: No heat/cold intolerance, no polyuria,polydipsia or polyphagia. Neuro: No unilateral weakness, numbness, tingling. No seizures. Heme: No easy bruising or bleeding. Physical Exam:     Physical Exam:  Visit Vitals  BP (!) 108/50 (BP 1 Location: Left arm, BP Patient Position: At rest)   Pulse 84   Temp 98.4 °F (36.9 °C)   Resp 20   Ht 5' 6\" (1.676 m)   Wt 90.7 kg (200 lb)   SpO2 97%   BMI 32.28 kg/m²      O2 Device: Room air    Temp (24hrs), Av.2 °F (36.8 °C), Min:97.9 °F (36.6 °C), Max:98.4 °F (36.9 °C)    No intake/output data recorded. 02/15 0701 -  1900  In: 1500 [I.V.:1500]  Out: -     General:  Awake, cooperative, no distress. Head:  Normocephalic, without obvious abnormality, atraumatic. Eyes:  Conjunctivae/corneas clear, sclera anicteric, PERRL, EOMs intact. Nose: Nares normal. No drainage or sinus tenderness. Throat: Lips, mucosa, and tongue normal.    Neck: Supple, symmetrical, trachea midline, no adenopathy. Lungs:   Clear to auscultation bilaterally. Heart:  Regular rate and rhythm, S1, S2 normal, no murmur, click, rub or gallop. Abdomen: Soft, non-tender. Bowel sounds normal. No masses,  No organomegaly.    Extremities: Extremities normal, atraumatic, no cyanosis or edema. Capillary refill normal.   Pulses: 2+ and symmetric all extremities. Skin: Skin color as per ethnicity, turgor normal. No rashes or lesions   Neurologic: CNII-XII intact. No focal motor or sensory deficit. Labs Reviewed:    Recent Results (from the past 24 hour(s))   CBC WITH AUTOMATED DIFF    Collection Time: 02/16/21 12:21 PM   Result Value Ref Range    WBC 3.8 (L) 4.6 - 13.2 K/uL    RBC 3.67 (L) 4.70 - 5.50 M/uL    HGB 12.8 (L) 13.0 - 16.0 g/dL    HCT 37.9 36.0 - 48.0 %    .3 (H) 74.0 - 97.0 FL    MCH 34.9 (H) 24.0 - 34.0 PG    MCHC 33.8 31.0 - 37.0 g/dL    RDW 12.0 11.6 - 14.5 %    PLATELET 517 701 - 496 K/uL    MPV 10.6 9.2 - 11.8 FL    NEUTROPHILS 64 40 - 73 %    LYMPHOCYTES 21 21 - 52 %    MONOCYTES 14 (H) 3 - 10 %    EOSINOPHILS 1 0 - 5 %    BASOPHILS 0 0 - 2 %    ABS. NEUTROPHILS 2.4 1.8 - 8.0 K/UL    ABS. LYMPHOCYTES 0.8 (L) 0.9 - 3.6 K/UL    ABS. MONOCYTES 0.5 0.05 - 1.2 K/UL    ABS. EOSINOPHILS 0.0 0.0 - 0.4 K/UL    ABS. BASOPHILS 0.0 0.0 - 0.1 K/UL    DF AUTOMATED     METABOLIC PANEL, COMPREHENSIVE    Collection Time: 02/16/21 12:21 PM   Result Value Ref Range    Sodium 139 136 - 145 mmol/L    Potassium 4.5 3.5 - 5.5 mmol/L    Chloride 107 100 - 111 mmol/L    CO2 24 21 - 32 mmol/L    Anion gap 8 3.0 - 18 mmol/L    Glucose 142 (H) 74 - 99 mg/dL    BUN 30 (H) 7.0 - 18 MG/DL    Creatinine 2.47 (H) 0.6 - 1.3 MG/DL    BUN/Creatinine ratio 12 12 - 20      GFR est AA 32 (L) >60 ml/min/1.73m2    GFR est non-AA 26 (L) >60 ml/min/1.73m2    Calcium 8.7 8.5 - 10.1 MG/DL    Bilirubin, total 0.6 0.2 - 1.0 MG/DL    ALT (SGPT) 49 16 - 61 U/L    AST (SGOT) 48 (H) 10 - 38 U/L    Alk.  phosphatase 110 45 - 117 U/L    Protein, total 7.0 6.4 - 8.2 g/dL    Albumin 3.3 (L) 3.4 - 5.0 g/dL    Globulin 3.7 2.0 - 4.0 g/dL    A-G Ratio 0.9 0.8 - 1.7     SARS-COV-2    Collection Time: 02/16/21  1:16 PM   Result Value Ref Range    SARS-CoV-2 Please find results under separate order METABOLIC PANEL, BASIC    Collection Time: 02/16/21  3:10 PM   Result Value Ref Range    Sodium 140 136 - 145 mmol/L    Potassium 4.3 3.5 - 5.5 mmol/L    Chloride 111 100 - 111 mmol/L    CO2 25 21 - 32 mmol/L    Anion gap 4 3.0 - 18 mmol/L    Glucose 90 74 - 99 mg/dL    BUN 28 (H) 7.0 - 18 MG/DL    Creatinine 2.25 (H) 0.6 - 1.3 MG/DL    BUN/Creatinine ratio 12 12 - 20      GFR est AA 35 (L) >60 ml/min/1.73m2    GFR est non-AA 29 (L) >60 ml/min/1.73m2    Calcium 7.6 (L) 8.5 - 10.1 MG/DL   CARDIAC PANEL,(CK, CKMB & TROPONIN)    Collection Time: 02/16/21  3:10 PM   Result Value Ref Range    CK - MB <1.0 <3.6 ng/ml    CK-MB Index  0.0 - 4.0 %     CALCULATION NOT PERFORMED WHEN RESULT IS BELOW LINEAR LIMIT     39 - 308 U/L    Troponin-I, QT <0.02 0.0 - 0.045 NG/ML   EKG, 12 LEAD, INITIAL    Collection Time: 02/16/21  5:37 PM   Result Value Ref Range    Ventricular Rate 83 BPM    Atrial Rate 83 BPM    P-R Interval 128 ms    QRS Duration 134 ms    Q-T Interval 392 ms    QTC Calculation (Bezet) 460 ms    Calculated P Axis 64 degrees    Calculated R Axis 134 degrees    Calculated T Axis 61 degrees    Diagnosis       Normal sinus rhythm  Right bundle branch block  Abnormal ECG  When compared with ECG of 02-JAN-2017 11:51,  No significant change was found         Procedures/imaging: see electronic medical records for all procedures/Xrays and details which were not copied into this note but were reviewed prior to creation of Plan      CC: Other, MD Sophy

## 2021-02-16 NOTE — PROGRESS NOTES
1814 : received report from [facility]  3375 : TRANSFER - IN REPORT:    Verbal report received from [facility]) on Evelyns  being received from ED(unit) for routine progression of care      Report consisted of patients Situation, Background, Assessment and   Recommendations(SBAR). Information from the following report(s) SBAR, Kardex, Intake/Output and MAR was reviewed with the receiving nurse. Opportunity for questions and clarification was provided. Assessment completed upon patients arrival to unit and care assumed. 1850 : patient oriented to room, bed in low position, shift assessment completed, patient denies pain or discomfort. Lungs are clear diminished. Fluids started. 1915 : Bedside shift change report given to [address] (oncoming nurse) by Ilana Velasco RN (offgoing nurse). Report included the following information SBAR, Kardex, Intake/Output and MAR.

## 2021-02-16 NOTE — ED PROVIDER NOTES
EMERGENCY DEPARTMENT HISTORY AND PHYSICAL EXAM    Date: 2/16/2021  Patient Name: Jaylen Song    History of Presenting Illness     Chief Complaint   Patient presents with    Back Pain    Diarrhea    Generalized Body Aches         History Provided By: Patient    12:44 PM  Jaylen Song is a 79 y.o. male with PMHX of hypertension, hyperlipidemia, diabetes, seizures, atrial fibrillation on Eliquis who presents to the emergency department C/O body aches and diarrhea. Patient states he developed intermittent diarrhea last week, has been taking antidiarrhea medication with relief. He had his first COVID-19 vaccine at the [facility] 5 days ago. He had bilateral knee injections that same day coincidentally. The following day he developed mild body aches, mostly in his legs and shoulders. 2 days ago, he had increased body aches, sweats, mild headache and his knee gave out on him and caused him to almost fall. Pt denies fever, chest pain, shortness of breath, abdominal pain, vomiting, and any other sxs or complaints. PCP: Sophy Rizzo MD    Current Outpatient Medications   Medication Sig Dispense Refill    LISINOPRIL PO Take  by mouth.  cyclobenzaprine (FLEXERIL) 10 mg tablet Take 1 Tab by mouth three (3) times daily (with meals). 20 Tab 0    HYDROcodone-acetaminophen (NORCO) 5-325 mg per tablet Take 1 Tab by mouth every four (4) hours as needed for Pain. Max Daily Amount: 6 Tabs. 10 Tab 0    apixaban (ELIQUIS) 2.5 mg tablet Take 1 tablet by mouth two (2) times a day. 60 tablet 0    levETIRAcetam (KEPPRA) 1,000 mg tablet Take 1 Tab by mouth two (2) times a day. 60 Tab 0    levetiracetam (KEPPRA) 1,000 mg tablet Take  by mouth two (2) times a day. Dose unknown by pt       glipiZIDE (GLUCOTROL) 10 mg tablet Take 10 mg by mouth two (2) times a day. Dose unknown by pt       rosuvastatin (CRESTOR) 20 mg tablet Take 20 mg by mouth daily.  Dose unknown by pt          Past History     Past Medical History:  Past Medical History:   Diagnosis Date    Atrial fibrillation ([facility] [address] [address]     [facility]     Hypercholesteremia     Hypertension     Seizures ([facility]        Past Surgical History:  Past Surgical History:   Procedure Laterality Date    AR ABDOMEN SURGERY PROC UNLISTED      appendectomy       Family History:  History reviewed. No pertinent family history. Social History:  Social History     Tobacco Use    Smoking status: Never Smoker    Smokeless tobacco: Never Used   Substance Use Topics    Alcohol use: Yes     Comment: ocassionally    Drug use: Never       Allergies: Allergies   Allergen Reactions    Pcn [Penicillins] Other (comments)         Review of Systems   Review of Systems   Constitutional: Positive for diaphoresis. Respiratory: Negative for cough and shortness of breath. Cardiovascular: Negative for chest pain. Gastrointestinal: Positive for diarrhea. Negative for abdominal pain, nausea and vomiting. All other systems reviewed and are negative. Physical Exam     Vitals:    02/16/21 1545 02/16/21 1600 02/16/21 1615 02/16/21 1630   BP: (!) 123/59 123/65 122/60 110/64   Pulse: 77 78 80 78   Resp: 18 15 15 15   Temp:       SpO2: 95% 93% 96% 95%   Weight:       Height:         Physical Exam  Vital signs and nursing notes reviewed. CONSTITUTIONAL: Alert. Well-appearing; overweight; in no apparent distress. HEAD: Normocephalic; atraumatic. EYES:  Conjunctiva clear. ENT:  Moist mucus membranes. NECK: Supple; FROM without difficulty, non-tender; no cervical lymphadenopathy. CV: Normal S1, S2; no murmurs, rubs, or gallops. No chest wall tenderness. RESPIRATORY: Normal chest excursion with respiration; breath sounds clear and equal bilaterally; no wheezes, rhonchi, or rales. GI: Normal bowel sounds; non-distended; non-tender. BACK:  No evidence of trauma or deformity. Non-tender to palpation.   EXT: Normal ROM in all four extremities; non-tender to palpation. SKIN: Normal for age and race; warm; dry; good turgor; no apparent lesions or exudate. NEURO: A & O x3. PSYCH:  Mood and affect appropriate. Diagnostic Study Results     Labs -     Recent Results (from the past 12 hour(s))   CBC WITH AUTOMATED DIFF    Collection Time: 02/16/21 12:21 PM   Result Value Ref Range    WBC 3.8 (L) 4.6 - 13.2 K/uL    RBC 3.67 (L) 4.70 - 5.50 M/uL    HGB 12.8 (L) 13.0 - 16.0 g/dL    HCT 37.9 36.0 - 48.0 %    .3 (H) 74.0 - 97.0 FL    MCH 34.9 (H) 24.0 - 34.0 PG    MCHC 33.8 31.0 - 37.0 g/dL    RDW 12.0 11.6 - 14.5 %    PLATELET 792 841 - 653 K/uL    MPV 10.6 9.2 - 11.8 FL    NEUTROPHILS 64 40 - 73 %    LYMPHOCYTES 21 21 - 52 %    MONOCYTES 14 (H) 3 - 10 %    EOSINOPHILS 1 0 - 5 %    BASOPHILS 0 0 - 2 %    ABS. NEUTROPHILS 2.4 1.8 - 8.0 K/UL    ABS. LYMPHOCYTES 0.8 (L) 0.9 - 3.6 K/UL    ABS. MONOCYTES 0.5 0.05 - 1.2 K/UL    ABS. EOSINOPHILS 0.0 0.0 - 0.4 K/UL    ABS. BASOPHILS 0.0 0.0 - 0.1 K/UL    DF AUTOMATED     METABOLIC PANEL, COMPREHENSIVE    Collection Time: 02/16/21 12:21 PM   Result Value Ref Range    Sodium 139 136 - 145 mmol/L    Potassium 4.5 3.5 - 5.5 mmol/L    Chloride 107 100 - 111 mmol/L    CO2 24 21 - 32 mmol/L    Anion gap 8 3.0 - 18 mmol/L    Glucose 142 (H) 74 - 99 mg/dL    BUN 30 (H) 7.0 - 18 MG/DL    Creatinine 2.47 (H) 0.6 - 1.3 MG/DL    BUN/Creatinine ratio 12 12 - 20      GFR est AA 32 (L) >60 ml/min/1.73m2    GFR est non-AA 26 (L) >60 ml/min/1.73m2    Calcium 8.7 8.5 - 10.1 MG/DL    Bilirubin, total 0.6 0.2 - 1.0 MG/DL    ALT (SGPT) 49 16 - 61 U/L    AST (SGOT) 48 (H) 10 - 38 U/L    Alk.  phosphatase 110 45 - 117 U/L    Protein, total 7.0 6.4 - 8.2 g/dL    Albumin 3.3 (L) 3.4 - 5.0 g/dL    Globulin 3.7 2.0 - 4.0 g/dL    A-G Ratio 0.9 0.8 - 1.7     SARS-COV-2    Collection Time: 02/16/21  1:16 PM   Result Value Ref Range    SARS-CoV-2 Please find results under separate order     METABOLIC PANEL, BASIC    Collection Time: 02/16/21  3:10 PM   Result Value Ref Range    Sodium 140 136 - 145 mmol/L    Potassium 4.3 3.5 - 5.5 mmol/L    Chloride 111 100 - 111 mmol/L    CO2 25 21 - 32 mmol/L    Anion gap 4 3.0 - 18 mmol/L    Glucose 90 74 - 99 mg/dL    BUN 28 (H) 7.0 - 18 MG/DL    Creatinine 2.25 (H) 0.6 - 1.3 MG/DL    BUN/Creatinine ratio 12 12 - 20      GFR est AA 35 (L) >60 ml/min/1.73m2    GFR est non-AA 29 (L) >60 ml/min/1.73m2    Calcium 7.6 (L) 8.5 - 10.1 MG/DL   EKG, 12 LEAD, INITIAL    Collection Time: 02/16/21  5:37 PM   Result Value Ref Range    Ventricular Rate 83 BPM    Atrial Rate 83 BPM    P-R Interval 128 ms    QRS Duration 134 ms    Q-T Interval 392 ms    QTC Calculation (Bezet) 460 ms    Calculated P Axis 64 degrees    Calculated R Axis 134 degrees    Calculated T Axis 61 degrees    Diagnosis       Normal sinus rhythm  Right bundle branch block  Abnormal ECG  When compared with ECG of 02-JAN-2017 11:51,  No significant change was found         Radiologic Studies -   No orders to display     CT Results  (Last 48 hours)    None        CXR Results  (Last 48 hours)    None          Medications given in the ED-  Medications   sodium chloride 0.9 % bolus infusion 500 mL (0 mL IntraVENous IV Completed 2/16/21 1423)   sodium chloride 0.9 % bolus infusion 1,000 mL (0 mL IntraVENous IV Completed 2/16/21 8287)         Medical Decision Making   I am the first provider for this patient. I reviewed the vital signs, available nursing notes, past medical history, past surgical history, family history and social history. Vital Signs-Reviewed the patient's vital signs. Records Reviewed: Nursing Notes, Old Medical Records and Previous Laboratory Studies    EKG  Normal sinus rhythm, rate 83, RBBB, no STEMI; similar to January 2017. Procedures:  Procedures    ED Course:  12:44 PM   Initial assessment performed. The patients presenting problems have been discussed, and they are in agreement with the care plan formulated and outlined with them.   I have encouraged them to ask questions as they arise throughout their visit. 2 PM progress note  Patient states he is unaware if he has any chronic kidney disease, however review of prior labs from 2014 2017 show elevated creatinine, most recent 1.91. Suspect acute kidney injury from dehydration from recent diarrhea. He otherwise feels normal now. Will hydrate and recheck creatinine. 2:05 PM Consult Note  Case discussed with ED attending Dr. Kodi Mcclelland who agrees with above plan. 4 PM progress note  Patient's BUN/creatinine did slightly improve after IV fluids, suspect acute on chronic renal insufficiency related to recent diarrhea illness. His diarrhea has improved, abdomen nontender. He feels well and is resting comfortably. Will attempt to check with the [facility] for his baseline creatinine. 4:38 PM progress note  [place] spoke to nurse at [facility], states patient's creatinine was 1.7 on 1/28/2021.    5:28 PM   Awaiting hospitalist call back for admission for BERNADETTE. Will add EKG and cardiac panel as well to r/o rhabdomyolysis as cause for BERNADETTE. 5:35 PM  Case discussed with hospitalist Dr. Mora Chapa, who will admit to medical floor. Aware that cardiac enzymes are pending but will admit to medical floor at this time and trend troponin is elevated as he is not having symptoms of ACS but need to rule out rhabdo. Diagnosis and Disposition     ADMISSION NOTE:  5:38 PM  Patient is being admitted to the hospital by Dr. Mora Chapa. The results of their tests and reasons for their admission have been discussed with them and/or available family. They convey agreement and understanding for the need to be admitted and for their admission diagnosis. CONDITIONS ON ADMISSION:  Deep Vein Thrombosis is not present at the time of admission. Thrombosis is not present at the time of admission. Urinary Tract Infection is not present at the time of admission. Pneumonia is not present at the time of admission. MRSA is not present at the time of admission. Wound infection is not present at the time of admission. Pressure Ulcer is not present at the time of admission. CLINICAL IMPRESSION:    1. BERNADETTE (acute kidney injury) ([facility]    2. Diarrhea, unspecified type        PLAN:    1. Admit        Please note that this dictation was completed with [employer], the computer voice recognition software. Quite often unanticipated grammatical, syntax, homophones, and other interpretive errors are inadvertently transcribed by the computer software. Please disregard these errors. Please excuse any errors that have escaped final proofreading.

## 2021-02-16 NOTE — ED TRIAGE NOTES
Per pt he received his covid vaccine Thursday along with two knee injections. Pt states he began feeling ill Saturday with diarrhea and diaphoresis. Pt states his whole body hurts and he isnt able to stand for more than a few minutes. Pt states the pain is worse in his knees and lower back. Pt adds that has an intermittent headache that starts at the base of his neck and comes over into his forehead.

## 2021-02-17 VITALS
BODY MASS INDEX: 33.22 KG/M2 | HEIGHT: 66 IN | DIASTOLIC BLOOD PRESSURE: 63 MMHG | SYSTOLIC BLOOD PRESSURE: 111 MMHG | WEIGHT: 206.7 LBS | TEMPERATURE: 98.8 F | RESPIRATION RATE: 20 BRPM | HEART RATE: 83 BPM | OXYGEN SATURATION: 98 %

## 2021-02-17 LAB
ANION GAP SERPL CALC-SCNC: 7 MMOL/L (ref 3–18)
APPEARANCE UR: CLEAR
BACTERIA URNS QL MICRO: ABNORMAL /HPF
BILIRUB UR QL: NEGATIVE
BUN SERPL-MCNC: 23 MG/DL (ref 7–18)
BUN/CREAT SERPL: 13 (ref 12–20)
CALCIUM SERPL-MCNC: 7.7 MG/DL (ref 8.5–10.1)
CHLORIDE SERPL-SCNC: 111 MMOL/L (ref 100–111)
CO2 SERPL-SCNC: 22 MMOL/L (ref 21–32)
COLOR UR: YELLOW
CREAT SERPL-MCNC: 1.78 MG/DL (ref 0.6–1.3)
CREAT UR-MCNC: 110 MG/DL (ref 30–125)
EPITH CASTS URNS QL MICRO: ABNORMAL /LPF (ref 0–5)
ERYTHROCYTE [DISTWIDTH] IN BLOOD BY AUTOMATED COUNT: 12 % (ref 11.6–14.5)
GLUCOSE BLD STRIP.AUTO-MCNC: 100 MG/DL (ref 70–110)
GLUCOSE SERPL-MCNC: 88 MG/DL (ref 74–99)
GLUCOSE UR STRIP.AUTO-MCNC: NEGATIVE MG/DL
HCT VFR BLD AUTO: 33.8 % (ref 36–48)
HGB BLD-MCNC: 11.6 G/DL (ref 13–16)
HGB UR QL STRIP: NEGATIVE
KETONES UR QL STRIP.AUTO: NEGATIVE MG/DL
LEUKOCYTE ESTERASE UR QL STRIP.AUTO: NEGATIVE
MCH RBC QN AUTO: 35 PG (ref 24–34)
MCHC RBC AUTO-ENTMCNC: 34.3 G/DL (ref 31–37)
MCV RBC AUTO: 102.1 FL (ref 74–97)
NITRITE UR QL STRIP.AUTO: NEGATIVE
PH UR STRIP: 5 [PH] (ref 5–8)
PLATELET # BLD AUTO: 138 K/UL (ref 135–420)
PMV BLD AUTO: 10.1 FL (ref 9.2–11.8)
POTASSIUM SERPL-SCNC: 4.5 MMOL/L (ref 3.5–5.5)
PROT UR STRIP-MCNC: ABNORMAL MG/DL
PROT UR-MCNC: 34 MG/DL
PROT/CREAT UR-RTO: 0.3
RBC # BLD AUTO: 3.31 M/UL (ref 4.7–5.5)
RBC #/AREA URNS HPF: ABNORMAL /HPF (ref 0–5)
SARS-COV-2, COV2NT: DETECTED
SODIUM SERPL-SCNC: 140 MMOL/L (ref 136–145)
SP GR UR REFRACTOMETRY: 1.01 (ref 1–1.03)
UROBILINOGEN UR QL STRIP.AUTO: 0.2 EU/DL (ref 0.2–1)
WBC # BLD AUTO: 3.2 K/UL (ref 4.6–13.2)
WBC URNS QL MICRO: ABNORMAL /HPF (ref 0–5)

## 2021-02-17 PROCEDURE — 82962 GLUCOSE BLOOD TEST: CPT

## 2021-02-17 PROCEDURE — 85027 COMPLETE CBC AUTOMATED: CPT

## 2021-02-17 PROCEDURE — 36415 COLL VENOUS BLD VENIPUNCTURE: CPT

## 2021-02-17 PROCEDURE — 80048 BASIC METABOLIC PNL TOTAL CA: CPT

## 2021-02-17 PROCEDURE — 81001 URINALYSIS AUTO W/SCOPE: CPT

## 2021-02-17 NOTE — PROGRESS NOTES
Hospitalist Progress Note    Patient: Marj Barreto MRN: [id number]  CSN: [id number]    YOB: 1950  Age: 79 y.o. Sex: male    DOA: 2/16/2021 LOS:  LOS: 1 day          Chief Complaint:    malaise    Assessment/Plan     UPDATE: Called and spoke to patient because patient insistent on leaving [address] Patient very upset but cannot explain completely why he is upset except for the fact that he does not want to stay here until the results of his Covid test are back. I asked patient that the plan was not to have him stay here until his Covid test was back and that he could go home and quarantine until results were available. Reiterated with patient that we were in the process of determining why he is having loose watery diarrhea continuing to hydrate him for improved kidney function. Patient continued to be very upset and could not be consoled and insisted on leaving [address] even though I fully explained that it was not safe or advisable to do so. Explained that leaving [address] can increase patient's morbidity and mortality. Have advised patient that if he does not feel well or has increasing symptoms that he should return to any local emergency room for further evaluation. Updated patient's bedside nurse that patient will leave [address]        Acute Kidney Injury -  Creatinine has improved overnight and is now 1.78 to IV fluid hydration overnight, which is close to his baseline  Likely due to significant fluid loss from persistent diarrhea     Diarrhea - associated with malaise, chills, anorexia and body sweats  Recent covid vaccine, #1  Loose watery stools have resumed,   Plan to obtain stool studies including C. difficile    Suspect COVID-19 infection -  COVID test pending   Recent vaccine  No acute 02 requirement      Hypertension -  Controlled   Will monitor BP   Resume BP meds as appropriate      Diabtetes mellitus -  fsbg qac and qhs, SSI, ADA      HLD -  Resume home meds as appropriate      PAF on eliquis -  No acute issues   Resume eliquis      Seizure d/o -  No acute isses  Resume home meds     Disposition :  Patient Active Problem List   Diagnosis Code    Seizure ([facility] R56.9    BERNADETTE (acute kidney injury) ([facility] N17.9    Diarrhea R19.7    Suspected 2019-nCoV infection Z20.822    PAF (paroxysmal atrial fibrillation) (HCC) I48.0    DM (diabetes mellitus) ([facility] E11.9    HTN (hypertension) I10    Dehydration E86.0       Subjective:    Initially patient very pleasant, did note that his loose watery stools have returned agree with plan to collect stool sample so that we can analyze it further. Is concerned that he would have to stay in the hospital until his Covid test came back, he assured when I explained that that was not the case. About an hour later patient became very upset told nurse that he wanted to leave [address] He became fixated on the fact that he would have to stay in the hospital until his Covid test came back though that was not what I order nurse advised him. Review of systems:    Constitutional: denies fevers, chills, myalgias  Respiratory: denies SOB, cough  Cardiovascular: denies chest pain, palpitations  Gastrointestinal: denies nausea, vomiting, diarrhea      Vital signs/Intake and Output:  Visit Vitals  /63   Pulse 83   Temp 98.8 °F (37.1 °C)   Resp 20   Ht 5' 6\" (1.676 m)   Wt 93.8 kg (206 lb 11.2 oz)   SpO2 98%   BMI 33.36 kg/m²     Current Shift:  No intake/output data recorded.   Last three shifts:  02/15 1901 - 02/17 0700  In: 1500 [I.V.:1500]  Out: 400 [Urine:400]    Exam:    General: Well developed, alert, NAD, OX3  Head/Neck: NCAT, supple, No masses, No lymphadenopathy  CVS:Regular rate and rhythm, no M/R/G, S1/S2 heard, no thrill  Lungs:Clear to auscultation bilaterally, no wheezes, rhonchi, or rales  Abdomen: Soft, Nontender, No distention, Normal Bowel sounds, No hepatomegaly  Extremities: No C/C/E, pulses palpable 2+  Skin:normal texture and turgor, no rashes, no lesions  Neuro:grossly normal , follows commands  Psych:appropriate                Labs: Results:       Chemistry Recent Labs     02/17/21 0525 02/16/21 2140 02/16/21  1510 02/16/21  1221   GLU 88 48* 90 142*    141 140 139   K 4.5 4.3 4.3 4.5    109 111 107   CO2 22 24 25 24   BUN 23* 26* 28* 30*   CREA 1.78* 2.06* 2.25* 2.47*   CA 7.7* 8.1* 7.6* 8.7   AGAP 7 8 4 8   BUCR 13 13 12 12   AP  --   --   --  110   TP  --   --   --  7.0   ALB  --  3.0*  --  3.3*   GLOB  --   --   --  3.7   AGRAT  --   --   --  0.9      CBC w/Diff Recent Labs     02/17/21 0525 02/16/21  1221   WBC 3.2* 3.8*   RBC 3.31* 3.67*   HGB 11.6* 12.8*   HCT 33.8* 37.9    158   GRANS  --  64   LYMPH  --  21   EOS  --  1      Cardiac Enzymes Recent Labs     02/16/21  1510      CKND1 CALCULATION NOT PERFORMED WHEN RESULT IS BELOW LINEAR LIMIT      Coagulation No results for input(s): PTP, INR, APTT, INREXT in the last 72 hours. Lipid Panel Lab Results   Component Value Date/Time    Cholesterol, total 279 (H) 11/17/2014 08:50 AM    HDL Cholesterol 68 (H) 11/17/2014 08:50 AM    LDL, calculated 185.6 (H) 11/17/2014 08:50 AM    VLDL, calculated 25.4 11/17/2014 08:50 AM    Triglyceride 127 11/17/2014 08:50 AM    CHOL/HDL Ratio 4.1 11/17/2014 08:50 AM      BNP No results for input(s): BNPP in the last 72 hours.    Liver Enzymes Recent Labs     02/16/21 2140 02/16/21  1221   TP  --  7.0   ALB 3.0* 3.3*   AP  --  110      Thyroid Studies Lab Results   Component Value Date/Time    TSH 0.96 11/17/2014 08:50 AM        Procedures/imaging: see electronic medical records for all procedures/Xrays and details which were not copied into this note but were reviewed prior to creation of Jesse Boston MD

## 2021-02-17 NOTE — PROGRESS NOTES
Reason for Admission: BERNADETTE    Chart reviewed. Per H&P: \"Terrence Morgan is a 79 y.o. male with PMHX of hypertension, hyperlipidemia, diabetes, seizures, atrial fibrillation on Eliquis who presents to the emergency department C/O body aches and diarrhea.  Patient states he developed intermittent diarrhea last week, has been taking antidiarrhea medication with relief. Rolando Bryant had his first COVID-19 vaccine at the [place] [address] 5 days ago. Rolando Bryant had bilateral knee injections that same day coincidentally.  The following day he developed mild body aches, mostly in his legs and shoulders.  2 days ago, he had increased body aches, sweats, mild headache and his knee gave out on him and caused him to almost fall. Pt denies fever, chest pain, shortness of breath, abdominal pain, vomiting, and any other sxs or complaints. \"    [phone]: Patient left AMA before this CM could speak with him. Prior to admission patient was living:    Prior to admission patient was using the following DME:                     RUR Score: 16%                    Plan for utilizing home health: TBD         PCP: First and Last name:     Name of Practice:    Are you a current patient: Yes/No:    Approximate date of last visit:    Can you participate in a virtual visit with your PCP:                     Current Advanced Directive/Advance Care Plan: Full code, no ACP docs on file    Healthcare Decision Maker:   Click here to [address] including selection of the Healthcare Decision Maker Relationship (ie \"Primary\")                         Transition of Care Plan: In progress        Care Management Interventions  Transition of Care Consult (CM Consult): Discharge Planning  The Plan for Transition of Care is Related to the Following Treatment Goals :  BERNADETTE

## 2021-02-17 NOTE — PROGRESS NOTES
Problem: Falls - Risk of  Goal: *Absence of Falls  Description: Document Issac Anaya Fall Risk and appropriate interventions in the flowsheet.   Outcome: Progressing Towards Goal  Note: Fall Risk Interventions:  Mobility Interventions: Assess mobility with egress test         Medication Interventions: Teach patient to arise slowly

## 2021-02-17 NOTE — PROGRESS NOTES
Shift Summary:  Patient denied pain or discomfort. Denied SOB, lungs clear. Vital signs stable, afebrile. Covid testing pending. 3615 - Bedside and Verbal shift change report given to SARBJIT Linn (oncoming nurse) by Dontae Albarran (offgoing nurse). Report included the following information SBAR, Kardex, Intake/Output and MAR.

## 2021-02-17 NOTE — ROUTINE PROCESS
0 Verified with Dr. Byron Upton. Pt has NS at 125ml/hr and is eating/drinking. Recieved order to hold NS bolus.     6430 Report given to Dharmesh Manning RN

## 2021-02-17 NOTE — PROGRESS NOTES
0746:Received verbal bedside report from off going nurse JAMIA Verduzco Patient care received. Patient alert and oriented x 4. Patient resting in bed denies pain. Patient stable. Call light with in reach bed in lowest position. 0940: Informed  that patient needed home medication restarted. No orders received at this time. Will continue to monitor patient. 1200:Patient is upset saying he can wait on his COVID test at home. Patient aware that stool sample and urine sample also needed. Patient states that he can wait on his test results at home and insisting that he will go to [facility] [place] [facility] and would like to leave [place]. 1230:Pt refused vitals and blood sugar. 1236:Informed Dr.Teule Armas that patient wants to leave [place] and that patient is refusing to have his vitals and blood sugar done. Patient stated last time he came to the hospital he did not have to stay and wait for his COVID results. She said that patient is not just waiting for COVID results. She said she will call the patient.

## 2021-02-17 NOTE — PROGRESS NOTES
Problem: Falls - Risk of  Goal: *Absence of Falls  Description: Document Issac Anaya Fall Risk and appropriate interventions in the flowsheet.   Outcome: Progressing Towards Goal  Note: Fall Risk Interventions:  Mobility Interventions: OT consult for ADLs, Patient to call before getting OOB, PT Consult for mobility concerns, PT Consult for assist device competence         Medication Interventions: Patient to call before getting OOB, Teach patient to arise slowly                   Problem: Patient Education: Go to Patient Education Activity  Goal: Patient/Family Education  Outcome: Progressing Towards Goal

## 2021-02-18 ENCOUNTER — PATIENT OUTREACH (OUTPATIENT)
Dept: CASE MANAGEMENT | Age: 71
End: 2021-02-18

## 2021-02-18 NOTE — PROGRESS NOTES
[facility]". Discussed COVID-19 related testing which was available at this time. Test results were positive. Patient informed of results, if available? yes     Care Transition Nurse/ Ambulatory Care Manager contacted the patient by telephone to perform post discharge assessment. Call within 2 business days of discharge: Yes Verified name and  with patient as identifiers. Provided introduction to self, and explanation of the CTN/ACM role, and reason for call due to risk factors for infection and/or exposure to COVID-19. Symptoms reviewed with patient who verbalized the following symptoms: fatigue, shortness of breath and diarrhea      Due to no new or worsening symptoms encounter was not routed to provider for escalation. Discussed follow-up appointments. If no appointment was previously scheduled, appointment scheduling offered:  yes   Gavin Rene Robledo follow up appointment(s): No future appointments. Non-[facility] follow up appointment(s): n/a     Advance Care Planning:   Does patient have an Advance Directive:  decision maker updated. Patient has following risk factors of: diabetes and HTN, ^Lipids, A-Fib, seizure disorder. CTN/ACM reviewed discharge instructions, medical action plan and red flags such as increased shortness of breath, increasing fever and signs of decompensation with patient who verbalized understanding. Discussed exposure protocols and quarantine with CDC Guidelines What to do if you are sick with coronavirus disease .  Patient was given an opportunity for questions and concerns. The patient agrees to contact the [facility] exposure line [phone], [facility] [address]  ([phone] and PCP office for questions related to their healthcare. CTN/ACM provided contact information for future needs.     Reviewed and educated patient on any new and changed medications related to discharge diagnosis     Patient/family/caregiver given information for [facility] Loop and agrees to enroll no  Patient's preferred e-mail: declined   Patient's preferred phone number: declined  Based on Loop alert triggers, patient will be contacted by nurse care manager for worsening symptoms. Plan for follow-up call in 5-7 days based on severity of symptoms and risk factors. Spoke with patient and he stated that he is feeling better today. He stated that he has all his meds and is taking as directed. Patient stated that he is drinking plenty of fluid. Encouraged patient to remain quarantined. Advised him to wipe things down in the home such as door knobs and faucets to keep germs down. Also advised him to wash his hands frequently. He appreciated the call. Will follow.

## 2021-02-25 ENCOUNTER — PATIENT OUTREACH (OUTPATIENT)
Dept: CASE MANAGEMENT | Age: 71
End: 2021-02-25

## 2021-02-25 NOTE — PROGRESS NOTES
2/25/2021 2:59 PM    Called patient to follow up with him and see how he was doing. Patient was not available at the time of the call. Message left for patient to return my call. . CTN contact information given in message. Will follow.

## 2021-02-26 NOTE — PROGRESS NOTES
Physician Progress Note      PATIENT:               Gabriele DE LA CRUZ #:                  [id number]  :                       1950  ADMIT DATE:       2021 11:54 AM  DISCH DATE:        2021 12:55 PM  RESPONDING  PROVIDER #:        Laurita Marshall MD          QUERY TEXT:    Patient presented with persistent diarrhea and body aches. Patient was noted to have a creatinine of 2.47 up from her baseline of 1.7. 'BERNADETTE - likely due to significant fluid loss from persistent diarrhea/dehydration,' was documented. Patient was treated with IV fluids. There was concern for COVID-19 and patient left AMA before COVID testing returned. COVID testing since returned positive. Please document in progress notes and discharge summary if you were evaluating or treating any of the following: The medical record reflects the following:  Risk Factors: 79 yr old with body aches, diarrhea, dehydration; Covid positive    Clinical Indicators: - Per 21 H&P: Acute Kidney Injury -Likely due to significant fluid loss from persistent diarrhea;  dehydration;  c/o body aches and diarrhea  - Per 21 Reference Lab: SARS-CoV2: detected    Treatment: labs; IVF; covid protocol    Thank you for your time,  Bautista [facility], [facility]  Options provided:  -- Body aches, diarrhea, dehydration due to COVID-19  -- Body aches, diarrhea, dehydration unrelated to COVID-19  -- Other - I will add my own diagnosis  -- Disagree - Not applicable / Not valid  -- Disagree - Clinically unable to determine / Unknown  -- Refer to Clinical Documentation Reviewer    PROVIDER RESPONSE TEXT:    This patient had body aches, diarrhea, dehydration due to COVID-19. Query created by:  Melvin Elliott on 2021 4:33 PM      Electronically signed by:  Laurita Marshall MD 2021 5:48 PM

## 2021-03-04 ENCOUNTER — PATIENT OUTREACH (OUTPATIENT)
Dept: CASE MANAGEMENT | Age: 71
End: 2021-03-04

## 2021-03-04 NOTE — PROGRESS NOTES
Patient resolved from [address] Transitions episode on 3/4/2021. Discussed COVID-19 related testing which was available at this time. Test results were positive. Patient informed of results, if available? yes     Patient/family has been provided the following resources and education related to COVID-19:                         Signs, symptoms and red flags related to COVID-19            [facility] exposure and quarantine guidelines            [facility] exposure contact - [phone]            Contact for their local Department of Health                 Patient currently reports that the following symptoms have improved:  no new symptoms and no worsening symptoms. No further outreach scheduled with this CTN/ACM/LPN/HC/ MA. Episode of Care resolved. Patient has this CTN/ACM/LPN/HC/MA contact information if future needs arise.

## 2021-03-05 NOTE — PROGRESS NOTES
Physician Progress Note      PATIENT:               Rodney Morales  CSN #:                  [id number]  :                       1950  ADMIT DATE:       2021 11:54 AM  DISCH DATE:        2021 12:55 PM  RESPONDING  PROVIDER #:        Ashlee Beaver MD          QUERY TEXT:    Patient admitted with BERNADETTE; pt with history of htn & DM. Creatinine on admission was 2.47 up from their baseline of 1.7. Creatinine improved to 1.78 by the time patient left AMA. In order to support the diagnosis of BERNADETTE, please include additional clinical indicators in your documentation. Or please document if the diagnosis of BERNADETTE has been ruled out after further study. The medical record reflects the following:  Risk Factors: 79 yr old male with htn, DM  Clinical Indicators: - Per 21 H&P: Acute Kidney Injury - Likely due to significant fluid loss from persistent diarrhea. Creatinine 2.47, baseline from [facility] facility  is 1.7  - Labs: 21: creat 2.47 -> 2.25 -> 2.06;  GFR estAA 32 -> 35 -> 39  - Labs 21: creat 1.78,  GFR estAA 46  Treatment: labs; IVF    Thank you for your time,  Elke Barber, [address]    Defined by Kidney Disease Improving Global Outcomes (KDIGO) clinical practice guideline for acute kidney injury:  -Increase in SCr by greater than or equal to 0.3 mg/dl within 48 hours; or  -Increase in SCr to greater than or equal to 1.5 times baseline, which is known or presumed to have occurred within the prior 7 days; or  -Urine volume < 0.5ml/kg/h for 6 hours  Options provided:  -- Acute kidney injury evidenced by, Please document evidence as well as baseline creatinine, if known.   -- Acute kidney injury ruled out after study  -- Other - I will add my own diagnosis  -- Disagree - Not applicable / Not valid  -- Disagree - Clinically unable to determine / Unknown  -- Refer to Clinical Documentation Reviewer    PROVIDER RESPONSE TEXT:    Acute kidney injury was ruled out after study. Query created by:  Jose Luis Flores on 2/24/2021 3:44 PM      Electronically signed by:  Denae Reyes MD 3/5/2021 7:38 AM

## 2021-09-07 NOTE — ED TRIAGE NOTES
-- DO NOT REPLY / DO NOT REPLY ALL --  -- Message is from the [facility]--    General Patient Message      Reason for Call: patient called in regarding missed call please call patient mother back     Caller Information       Type Contact Phone    09/07/2021 04:13 PM CDT Phone (Incoming) RILEY BATRES (Mother) [phone]          Alternative phone number: na        Did the caller agree that this message can wait until the office reopens in the morning? YES - The Message Can Wait      Send a message to the provider’s clinical support pool.     Turnaround time given to caller:   \"This message will be sent to [state Provider's name]. The clinical team will fulfill your request as soon as they review your message when the office opens tomorrow.\"         Pt states \" I may have been exposed to covid, I have no symptoms but want to be checked. \"